# Patient Record
Sex: FEMALE | Race: WHITE | Employment: OTHER | URBAN - METROPOLITAN AREA
[De-identification: names, ages, dates, MRNs, and addresses within clinical notes are randomized per-mention and may not be internally consistent; named-entity substitution may affect disease eponyms.]

---

## 2021-12-03 RX ORDER — METFORMIN HYDROCHLORIDE 1000 MG/1
1000 TABLET ORAL DAILY
COMMUNITY

## 2021-12-03 RX ORDER — INSULIN GLARGINE AND LIXISENATIDE 100; 33 U/ML; UG/ML
INJECTION, SOLUTION SUBCUTANEOUS
COMMUNITY

## 2021-12-03 RX ORDER — ASPIRIN 81 MG/1
81 TABLET ORAL DAILY
COMMUNITY

## 2021-12-03 RX ORDER — CARVEDILOL 25 MG/1
25 TABLET ORAL DAILY
COMMUNITY

## 2021-12-03 NOTE — PERIOP NOTES
[facility]  Preoperative Instructions        Surgery Date 12/15/2021          Time of Arrival : to be called the day before surgery  Covid Testing 12/10/2021    1. On the day of your surgery, please report to the Surgical Services Registration Desk and sign in at your designated time. The Surgery Center is located to the right of the Emergency Room. 2. You must have someone with you to drive you home. You should not drive a car for 24 hours following surgery. Please make arrangements for a friend or family member to stay with you for the first 24 hours after your surgery. 3. Do not have anything to eat or drink (including water, gum, mints, coffee, juice) after midnight. ?This may not apply to medications prescribed by your physician. ?(Please note below the special instructions with medications to take the morning of your procedure.)    4. We recommend you do not drink any alcoholic beverages for 24 hours before and after your surgery. 5. Contact your surgeons office for instructions on the following medications: non-steroidal anti-inflammatory drugs (i.e. Advil, Aleve), vitamins, and supplements. (Some surgeons will want you to stop these medications prior to surgery and others may allow you to take them)  **If you are currently taking Plavix, Coumadin, Aspirin and/or other blood-thinning agents, contact your surgeon for instructions. ** Your surgeon will partner with the physician prescribing these medications to determine if it is safe to stop or if you need to continue taking. Please do not stop taking these medications without instructions from your surgeon    6. Wear comfortable clothes. Wear glasses instead of contacts. Do not bring any money or jewelry. Please bring picture ID, insurance card, and any prearranged co-payment or hospital payment. Do not wear make-up, particularly mascara the morning of your surgery.   Do not wear nail polish, particularly if you are having foot /hand surgery. Wear your hair loose or down, no ponytails, buns, anita pins or clips. All body piercings must be removed. Please shower with antibacterial soap for three consecutive days before and on the morning of surgery, but do not apply any lotions, powders or deodorants after the shower on the day of surgery. Please use a fresh towels after each shower. Please sleep in clean clothes and change bed linens the night before surgery. Please do not shave for 48 hours prior to surgery. Shaving of the face is acceptable. 7. You should understand that if you do not follow these instructions your surgery may be cancelled. If your physical condition changes (I.e. fever, cold or flu) please contact your surgeon as soon as possible. 8. It is important that you be on time. If a situation occurs where you may be late, please call [phone] (OR Holding Area). 9. If you have any questions and or problems, please call [phone] (Pre-admission Testing). 10. Your surgery time may be subject to change. You will receive a phone call the evening prior if your time changes. 11.  If having outpatient surgery, you must have someone to drive you here, stay with you during the duration of your stay, and to drive you home at time of discharge. Special Instructions:     TAKE ALL MEDICATIONS DAY OF SURGERY EXCEPT: metformin      I understand a pre-operative phone call will be made to verify my surgery time. In the event that I am not available, I give permission for a message to be left on my answering service and/or with another person?   Yes [phone]         ___________________      __________   _________    (Signature of Patient)             (Witness)                (Date and Time)

## 2021-12-03 NOTE — PERIOP NOTES
CBC and CMP 10/14/2021 rec from Dr. Stuart Hull, placed on patient's paper chart.  (Hgb 13.3, Plts 203, K 4.4, Na 136)

## 2021-12-10 ENCOUNTER — HOSPITAL ENCOUNTER (OUTPATIENT)
Dept: PREADMISSION TESTING | Age: 71
Discharge: HOME | End: 2021-12-10
Payer: MEDICARE

## 2021-12-10 PROCEDURE — U0005 INFEC AGEN DETEC AMPLI PROBE: HCPCS

## 2021-12-12 LAB
SARS-COV-2, XPLCVT: NOT DETECTED
SOURCE, COVRS: NORMAL

## 2021-12-15 ENCOUNTER — ANESTHESIA (OUTPATIENT)
Dept: SURGERY | Age: 71
End: 2021-12-15
Payer: MEDICARE

## 2021-12-15 ENCOUNTER — HOSPITAL ENCOUNTER (OUTPATIENT)
Age: 71
Setting detail: OUTPATIENT SURGERY
Discharge: HOME | End: 2021-12-15
Attending: SURGERY | Admitting: SURGERY
Payer: MEDICARE

## 2021-12-15 ENCOUNTER — ANESTHESIA EVENT (OUTPATIENT)
Dept: SURGERY | Age: 71
End: 2021-12-15
Payer: MEDICARE

## 2021-12-15 VITALS
OXYGEN SATURATION: 99 % | DIASTOLIC BLOOD PRESSURE: 62 MMHG | BODY MASS INDEX: 28.23 KG/M2 | RESPIRATION RATE: 18 BRPM | WEIGHT: 134.48 LBS | HEIGHT: 58 IN | HEART RATE: 72 BPM | TEMPERATURE: 97.3 F | SYSTOLIC BLOOD PRESSURE: 136 MMHG

## 2021-12-15 DIAGNOSIS — L76.82 PAIN AT SURGICAL INCISION: Primary | ICD-10-CM

## 2021-12-15 LAB
GLUCOSE BLD STRIP.AUTO-MCNC: 131 MG/DL (ref 65–117)
GLUCOSE BLD STRIP.AUTO-MCNC: 156 MG/DL (ref 65–117)
SERVICE CMNT-IMP: ABNORMAL
SERVICE CMNT-IMP: ABNORMAL

## 2021-12-15 PROCEDURE — 76210000020 HC REC RM PH II FIRST 0.5 HR: Performed by: SURGERY

## 2021-12-15 PROCEDURE — 82962 GLUCOSE BLOOD TEST: CPT

## 2021-12-15 PROCEDURE — 74011250636 HC RX REV CODE- 250/636: Performed by: STUDENT IN AN ORGANIZED HEALTH CARE EDUCATION/TRAINING PROGRAM

## 2021-12-15 PROCEDURE — 88305 TISSUE EXAM BY PATHOLOGIST: CPT

## 2021-12-15 PROCEDURE — 74011250636 HC RX REV CODE- 250/636: Performed by: NURSE ANESTHETIST, CERTIFIED REGISTERED

## 2021-12-15 PROCEDURE — 77030031139 HC SUT VCRL2 J&J -A: Performed by: SURGERY

## 2021-12-15 PROCEDURE — 74011000250 HC RX REV CODE- 250: Performed by: NURSE ANESTHETIST, CERTIFIED REGISTERED

## 2021-12-15 PROCEDURE — 88311 DECALCIFY TISSUE: CPT

## 2021-12-15 PROCEDURE — 74011250637 HC RX REV CODE- 250/637: Performed by: SURGERY

## 2021-12-15 PROCEDURE — 2709999900 HC NON-CHARGEABLE SUPPLY: Performed by: SURGERY

## 2021-12-15 PROCEDURE — 77030002916 HC SUT ETHLN J&J -A: Performed by: SURGERY

## 2021-12-15 PROCEDURE — 76060000032 HC ANESTHESIA 0.5 TO 1 HR: Performed by: SURGERY

## 2021-12-15 PROCEDURE — 76210000016 HC OR PH I REC 1 TO 1.5 HR: Performed by: SURGERY

## 2021-12-15 PROCEDURE — 76010000138 HC OR TIME 0.5 TO 1 HR: Performed by: SURGERY

## 2021-12-15 RX ORDER — LIDOCAINE HYDROCHLORIDE 10 MG/ML
0.1 INJECTION, SOLUTION EPIDURAL; INFILTRATION; INTRACAUDAL; PERINEURAL AS NEEDED
Status: DISCONTINUED | OUTPATIENT
Start: 2021-12-15 | End: 2021-12-15 | Stop reason: HOSPADM

## 2021-12-15 RX ORDER — OXYCODONE HYDROCHLORIDE 5 MG/1
5 TABLET ORAL AS NEEDED
Status: CANCELLED | OUTPATIENT
Start: 2021-12-15

## 2021-12-15 RX ORDER — SODIUM CHLORIDE, SODIUM LACTATE, POTASSIUM CHLORIDE, CALCIUM CHLORIDE 600; 310; 30; 20 MG/100ML; MG/100ML; MG/100ML; MG/100ML
INJECTION, SOLUTION INTRAVENOUS
Status: DISCONTINUED | OUTPATIENT
Start: 2021-12-15 | End: 2021-12-15 | Stop reason: HOSPADM

## 2021-12-15 RX ORDER — LIDOCAINE HYDROCHLORIDE 20 MG/ML
INJECTION, SOLUTION EPIDURAL; INFILTRATION; INTRACAUDAL; PERINEURAL AS NEEDED
Status: DISCONTINUED | OUTPATIENT
Start: 2021-12-15 | End: 2021-12-15 | Stop reason: HOSPADM

## 2021-12-15 RX ORDER — SODIUM CHLORIDE 0.9 % (FLUSH) 0.9 %
5-40 SYRINGE (ML) INJECTION EVERY 8 HOURS
Status: DISCONTINUED | OUTPATIENT
Start: 2021-12-15 | End: 2021-12-15 | Stop reason: HOSPADM

## 2021-12-15 RX ORDER — ROPIVACAINE HYDROCHLORIDE 5 MG/ML
INJECTION, SOLUTION EPIDURAL; INFILTRATION; PERINEURAL
Status: COMPLETED | OUTPATIENT
Start: 2021-12-15 | End: 2021-12-15

## 2021-12-15 RX ORDER — SODIUM CHLORIDE, SODIUM LACTATE, POTASSIUM CHLORIDE, CALCIUM CHLORIDE 600; 310; 30; 20 MG/100ML; MG/100ML; MG/100ML; MG/100ML
25 INJECTION, SOLUTION INTRAVENOUS CONTINUOUS
Status: DISCONTINUED | OUTPATIENT
Start: 2021-12-15 | End: 2021-12-15 | Stop reason: HOSPADM

## 2021-12-15 RX ORDER — CEFAZOLIN SODIUM 1 G/3ML
2 INJECTION, POWDER, FOR SOLUTION INTRAMUSCULAR; INTRAVENOUS ONCE
Status: DISCONTINUED | OUTPATIENT
Start: 2021-12-15 | End: 2021-12-15

## 2021-12-15 RX ORDER — TRAMADOL HYDROCHLORIDE 50 MG/1
50 TABLET ORAL
Qty: 12 TABLET | Refills: 0 | Status: SHIPPED | OUTPATIENT
Start: 2021-12-15 | End: 2021-12-18

## 2021-12-15 RX ORDER — FENTANYL CITRATE 50 UG/ML
25 INJECTION, SOLUTION INTRAMUSCULAR; INTRAVENOUS
Status: CANCELLED | OUTPATIENT
Start: 2021-12-15

## 2021-12-15 RX ORDER — CEFAZOLIN SODIUM/WATER 2 G/20 ML
SYRINGE (ML) INTRAVENOUS AS NEEDED
Status: DISCONTINUED | OUTPATIENT
Start: 2021-12-15 | End: 2021-12-15 | Stop reason: HOSPADM

## 2021-12-15 RX ORDER — ONDANSETRON 2 MG/ML
4 INJECTION INTRAMUSCULAR; INTRAVENOUS AS NEEDED
Status: CANCELLED | OUTPATIENT
Start: 2021-12-15

## 2021-12-15 RX ORDER — ONDANSETRON 2 MG/ML
INJECTION INTRAMUSCULAR; INTRAVENOUS AS NEEDED
Status: DISCONTINUED | OUTPATIENT
Start: 2021-12-15 | End: 2021-12-15 | Stop reason: HOSPADM

## 2021-12-15 RX ORDER — PROPOFOL 10 MG/ML
INJECTION, EMULSION INTRAVENOUS
Status: DISCONTINUED | OUTPATIENT
Start: 2021-12-15 | End: 2021-12-15 | Stop reason: HOSPADM

## 2021-12-15 RX ORDER — MIDAZOLAM HYDROCHLORIDE 1 MG/ML
INJECTION, SOLUTION INTRAMUSCULAR; INTRAVENOUS
Status: COMPLETED | OUTPATIENT
Start: 2021-12-15 | End: 2021-12-15

## 2021-12-15 RX ADMIN — CEFAZOLIN 2 G: 1 INJECTION, POWDER, FOR SOLUTION INTRAVENOUS at 10:56

## 2021-12-15 RX ADMIN — PROPOFOL 50 MCG/KG/MIN: 10 INJECTION, EMULSION INTRAVENOUS at 10:56

## 2021-12-15 RX ADMIN — MIDAZOLAM HYDROCHLORIDE 2 MG: 1 INJECTION, SOLUTION INTRAMUSCULAR; INTRAVENOUS at 10:29

## 2021-12-15 RX ADMIN — Medication 3 AMPULE: at 09:49

## 2021-12-15 RX ADMIN — ONDANSETRON HYDROCHLORIDE 4 MG: 2 INJECTION, SOLUTION INTRAMUSCULAR; INTRAVENOUS at 11:27

## 2021-12-15 RX ADMIN — ROPIVACAINE HYDROCHLORIDE 30 ML: 5 INJECTION, SOLUTION EPIDURAL; INFILTRATION; PERINEURAL at 10:29

## 2021-12-15 RX ADMIN — SODIUM CHLORIDE, POTASSIUM CHLORIDE, SODIUM LACTATE AND CALCIUM CHLORIDE: 600; 310; 30; 20 INJECTION, SOLUTION INTRAVENOUS at 10:52

## 2021-12-15 RX ADMIN — LIDOCAINE HYDROCHLORIDE 20 MG: 20 INJECTION, SOLUTION EPIDURAL; INFILTRATION; INTRACAUDAL; PERINEURAL at 10:59

## 2021-12-15 RX ADMIN — SODIUM CHLORIDE, POTASSIUM CHLORIDE, SODIUM LACTATE AND CALCIUM CHLORIDE 25 ML/HR: 600; 310; 30; 20 INJECTION, SOLUTION INTRAVENOUS at 10:08

## 2021-12-15 NOTE — OP NOTES
[address]  OPERATIVE REPORT    Name:  Bolivar Sapp  MR#:  [id number]  :  1950  ACCOUNT #:  [de-identified]  DATE OF SERVICE:  12/15/2021      PREOPERATIVE DIAGNOSIS:  Chronic left third toe wound. POSTOPERATIVE DIAGNOSIS:  Chronic left third toe wound. PROCEDURE PERFORMED:  Left third ray amputation. SURGEON:  Rey James MD    ANESTHESIA:  Regional block. SPECIMENS REMOVED:  As above    INDICATIONS:  The patient is a 40-year-old with a longstanding nonhealing left third toe wound which extends into the joint space. She has a palpable dorsal pedal pulse on the foot and she was advised ray amputation which she has accepted. PROCEDURE:  After informed consent, the patient supine on the operating table, after adequate induction of regional block anesthesia, site, and patient confirmation, and administration of prophylactic antibiotics, the left leg was prepped and draped in sterile fashion. A fishmouth incision was made at the base of the toe with the posterior plantar flap created longer than the dorsal flap. The ray was amputated and removed in its entirety and sent for gross pathologic specimen. The wound was irrigated and deemed hemostatic. There was excellent bleeding to cut tissue edges. Subcutaneous and soft tissue was closed with interrupted buried Vicryl sutures. The skin was closed with 5 interrupted nylon sutures. The patient tolerated the procedure well. No complications.       Yakelin Jimenez MD      [id number]  D:  12/15/2021 13:43  T:  12/15/2021 18:49  JOB #:  [id number]  CC:  Rey James MD

## 2021-12-15 NOTE — ANESTHESIA PROCEDURE NOTES
Peripheral Block    Start time: 12/15/2021 10:18 AM  End time: 12/15/2021 10:25 AM  Performed by: Magy Kaminski MD  Authorized by: Magy Kaminski MD       Pre-procedure: Indications: at surgeon's request, post-op pain management, procedure for pain and primary anesthetic    Preanesthetic Checklist: patient identified, risks and benefits discussed, site marked, timeout performed, anesthesia consent given and patient being monitored    Timeout Time: 10:16 EST          Block Type:   Block Type:  Saphenous and popliteal  Laterality:  Left  Monitoring:  Standard ASA monitoring, continuous pulse ox, frequent vital sign checks, heart rate, responsive to questions and oxygen  Injection Technique:  Single shot  Procedures: ultrasound guided    Patient Position: supine  Prep: chlorhexidine    : 10 cm above the popliteal fossa & tibial tuberosity.   Needle Type:  Stimuplex  Needle Gauge:  22 G  Needle Localization:  Ultrasound guidance  Medication Injected:  Midazolam (VERSED) injection, 2 mg  ropivacaine (PF) (NAROPIN)(0.5%) 5 mg/mL injection, 30 mL    Assessment:  Number of attempts:  1  Injection Assessment:  Incremental injection every 5 mL, local visualized surrounding nerve on ultrasound, negative aspiration for blood, no intravascular symptoms, no paresthesia and ultrasound image on chart  Patient tolerance:  Patient tolerated the procedure well with no immediate complications

## 2021-12-15 NOTE — PROGRESS NOTES
Patient discharged to home. Iv removed. Discharge instructions, scripts, and medication education done with patient and son.

## 2021-12-15 NOTE — BRIEF OP NOTE
Brief Postoperative Note    Patient: Timothy Nova  YOB: 1950  MRN: [id number]    Date of Procedure: 12/15/2021     Pre-Op Diagnosis: Chronic left 3rd toe wound    Post-Op Diagnosis: same      Procedure(s): LEFT THIRD TOE RAY AMPUTATION    Surgeon(s):  Ronnie Jon MD    Anesthesia: MAC     Estimated Blood Loss (mL): none    Complications: none    Specimens:   ID Type Source Tests Collected by Time Destination   1 : Left third toe amputation Preservative Toe  Ronnie Jon MD 12/15/2021 1113 Pathology        Findings: good vascularity    Electronically Signed by Arabella De Guzman MD on 12/15/2021 at 11:46 AM

## 2021-12-15 NOTE — PERIOP NOTES
1133 - PT ARRIVED INTO PACU 3 - SNORING. RESP EVEN AND UNLABORED. POXON 2LNC > 96%. BSB AND COARSE.  L FOOT KERLIX CD&I RONN PD PULSES 1+ PALP. 1215 - PT A&OX4. FLORES SPON AND TO COMMAND. LLE WEAKLY SECONDARY TO BLOCK. PT JALEEL SIPS OF G'JEMAL. 1230 - PT IN PHASE 2.  RESTING QUIETLY OFFER 0 COMPLAINTS/.  IV DC'C SECONDARY TO SITE INFILTRATED. WARM COMPRESS APPLIED.    1300 - REPORT GIVEN TO FRANKY RN - WAITING DISCHARGE INSTRUCTIONS - PT DRESSED AND IN WHEELCHAIR.

## 2021-12-15 NOTE — DISCHARGE INSTRUCTIONS
Patient Discharge Instructions    Cordelia Alba / [id number] : 1950    Admitted 12/15/2021 Discharged: 12/15/2021     What to do at Home  Recommended activity: Elevate leg for 24 hrs  Leave dressing for 72-96 hours. Keep dry  Surgical (nola) shoe for discharge  May weight bear as tolerated, but try to limit excess walking       Information obtained by :  I understand that if any problems occur once I am at home I am to contact my physician. I understand and acknowledge receipt of the instructions indicated above. R.N.'s Signature                                                                  Date/Time                                                                                                                                              Patient or Representative Signature                                                          Date/Time      Duncan Rabago MD    DISCHARGE SUMMARY from Nurse    The following personal items are in your possession at time of discharge:    Dental Appliances: None  Visual Aid: None  Home Medications: None  Jewelry: None  Clothing: None (left in in pt. room)  Other Valuables: None             PATIENT INSTRUCTIONS:    After general anesthesia or intravenous sedation, for 24 hours or while taking prescription Narcotics:  · Limit your activities  · Do not drive and operate hazardous machinery  · Do not make important personal or business decisions  · Do  not drink alcoholic beverages  · If you have not urinated within 8 hours after discharge, please contact your surgeon on call.     Report the following to your surgeon:  · Excessive pain, swelling, redness or odor of or around the surgical area  · Temperature over 100.5  · Nausea and vomiting lasting longer than 4 hours or if unable to take medications  · Any signs of decreased circulation or nerve impairment to extremity: change in color, persistent  numbness, tingling, coldness or increase pain  · Any questions    To prevent infection remember to refer to your handout on handwashing given to you by your nurse. *  Please give a list of your current medications to your Primary Care Provider. *  Please update this list whenever your medications are discontinued, doses are      changed, or new medications (including over-the-counter products) are added. *  Please carry medication information at all times in case of emergency situations. These are general instructions for a healthy lifestyle:    No smoking/ No tobacco products/ Avoid exposure to second hand smoke    Surgeon General's Warning:  Quitting smoking now greatly reduces serious risk to your health. Obesity, smoking, and sedentary lifestyle greatly increases your risk for illness    A healthy diet, regular physical exercise & weight monitoring are important for maintaining a healthy lifestyle    You may be retaining fluid if you have a history of heart failure or if you experience any of the following symptoms:  Weight gain of 3 pounds or more overnight or 5 pounds in a week, increased swelling in our hands or feet or shortness of breath while lying flat in bed. Please call your doctor as soon as you notice any of these symptoms; do not wait until your next office visit. Recognize signs and symptoms of STROKE:    F-face looks uneven    A-arms unable to move or move unevenly    S-speech slurred or non-existent    T-time-call 911 as soon as signs and symptoms begin-DO NOT go       Back to bed or wait to see if you get better-TIME IS BRAIN. The discharge information has been reviewed with the patient. The patient verbalized understanding. TO PREVENT AN INFECTION      1. [address] YOUR HANDS     To prevent infection, good handwashing is the most important thing you or your caregiver can do.       [facility] your hands with soap and water or use the hand  we gave you before you touch any wounds. 2. SHOWER     Use the antibacterial soap we gave you when you take a shower.  Shower with this soap until your wounds are healed.  To reach all areas of your body, you may need someone to help you.  Dont forget to clean your belly button with every shower. 3.  USE CLEAN SHEETS     Use freshly cleaned sheets on your bed after surgery.  To keep the surgery site clean, do not allow pets to sleep with you while your wound is still healing. 4. STOP SMOKING     Stop smoking, or at least cut back on smoking     Smoking slows your healing. 5.  CONTROL YOUR BLOOD SUGAR     High blood sugars slow wound healing. If you are diabetic, control your blood sugar levels before and after your surgery. Patient Education      Tramadol (Ultram, Ultram ER, Ryzolt, Theratramadol-60) - (By mouth)   Why this medicine is used:   Treats moderate to severe pain. This medicine is a narcotic pain reliever. Contact a nurse or doctor right away if you have:  · Extreme weakness, shallow breathing  · Seizures  · Seeing or hearing things that are not there  · Anxiety, restlessness, muscle spasms, fever, sweating, diarrhea  · Slow or fast heartbeat     Common side effects:  · Nausea, vomiting, constipation  · Headache, drowsiness  · Itching, feeling of warmth, redness of the face, neck, arms, and upper chest  © 2017 [facility] Information is for End User's use only and may not be sold, redistributed or otherwise used for commercial purposes.

## 2021-12-15 NOTE — ANESTHESIA PREPROCEDURE EVALUATION
Relevant Problems   No relevant active problems       Anesthetic History               Review of Systems / Medical History  Patient summary reviewed and pertinent labs reviewed    Pulmonary    COPD          Pertinent negatives: No asthma and smoker  Comments: Former smoker   Neuro/Psych       CVA: no residual symptoms    Pertinent negatives: No seizures   Cardiovascular    Hypertension          Hyperlipidemia  Pertinent negatives: No past MI and CHF       GI/Hepatic/Renal             Pertinent negatives: No renal disease   Endo/Other    Diabetes: type 2, using insulin      Pertinent negatives: No obesity   Other Findings            Physical Exam    Airway  Mallampati: II  TM Distance: > 6 cm  Neck ROM: normal range of motion   Mouth opening: Normal     Cardiovascular  Regular rate and rhythm,  S1 and S2 normal,  no murmur, click, rub, or gallop  Rhythm: regular  Rate: normal         Dental  No notable dental hx       Pulmonary  Breath sounds clear to auscultation               Abdominal  GI exam deferred       Other Findings            Anesthetic Plan    ASA: 3  Anesthesia type: MAC and regional - popliteal fossa block and saphenous block      Post-op pain plan if not by surgeon: peripheral nerve block single    Induction: Intravenous  Anesthetic plan and risks discussed with: Patient

## 2021-12-15 NOTE — ANESTHESIA POSTPROCEDURE EVALUATION
Procedure(s):  LEFT THIRD TOE AMPUTATION. MAC, regional    Anesthesia Post Evaluation      Multimodal analgesia: multimodal analgesia used between 6 hours prior to anesthesia start to PACU discharge  Patient location during evaluation: PACU  Patient participation: complete - patient participated  Level of consciousness: awake and alert  Pain score: 0  Pain management: adequate  Airway patency: patent  Anesthetic complications: no  Cardiovascular status: acceptable, hemodynamically stable and blood pressure returned to baseline  Respiratory status: acceptable and room air  Hydration status: euvolemic  Comments: Ambulatory precautions discussed with patient and communicated to family via RN. Post anesthesia nausea and vomiting:  none  Final Post Anesthesia Temperature Assessment:  Normothermia (36.0-37.5 degrees C)      INITIAL Post-op Vital signs:   Vitals Value Taken Time   /57 12/15/21 1225   Temp 36.3 °C (97.3 °F) 12/15/21 1134   Pulse 80 12/15/21 1229   Resp 18 12/15/21 1229   SpO2 98 % 12/15/21 1229   Vitals shown include unvalidated device data.

## 2023-05-21 RX ORDER — ASPIRIN 81 MG/1
81 TABLET ORAL DAILY
COMMUNITY

## 2023-05-21 RX ORDER — ATORVASTATIN CALCIUM 10 MG/1
10 TABLET, FILM COATED ORAL DAILY
COMMUNITY

## 2023-05-21 RX ORDER — CARVEDILOL 25 MG/1
25 TABLET ORAL DAILY
COMMUNITY

## 2023-05-21 RX ORDER — INSULIN GLARGINE AND LIXISENATIDE 100; 33 U/ML; UG/ML
INJECTION, SOLUTION SUBCUTANEOUS
COMMUNITY

## 2024-07-01 ENCOUNTER — APPOINTMENT (OUTPATIENT)
Facility: HOSPITAL | Age: 74
DRG: 092 | End: 2024-07-01
Payer: MEDICARE

## 2024-07-01 ENCOUNTER — HOSPITAL ENCOUNTER (INPATIENT)
Facility: HOSPITAL | Age: 74
LOS: 7 days | Discharge: NURSING, REHAB OR LONG-TERM CARE | DRG: 092 | End: 2024-07-09
Attending: EMERGENCY MEDICINE | Admitting: STUDENT IN AN ORGANIZED HEALTH CARE EDUCATION/TRAINING PROGRAM
Payer: MEDICARE

## 2024-07-01 DIAGNOSIS — N30.00 ACUTE CYSTITIS WITHOUT HEMATURIA: Primary | ICD-10-CM

## 2024-07-01 DIAGNOSIS — R26.81 GAIT INSTABILITY: ICD-10-CM

## 2024-07-01 DIAGNOSIS — R29.898 BILATERAL LEG WEAKNESS: ICD-10-CM

## 2024-07-01 DIAGNOSIS — R29.6 RECURRENT FALLS: ICD-10-CM

## 2024-07-01 LAB
ALBUMIN SERPL-MCNC: 3.4 G/DL (ref 3.5–5)
ALBUMIN/GLOB SERPL: 0.9 (ref 1.1–2.2)
ALP SERPL-CCNC: 81 U/L (ref 45–117)
ALT SERPL-CCNC: 61 U/L (ref 12–78)
ANION GAP SERPL CALC-SCNC: 6 MMOL/L (ref 5–15)
APPEARANCE UR: CLEAR
AST SERPL-CCNC: 19 U/L (ref 15–37)
BACTERIA URNS QL MICRO: ABNORMAL /HPF
BASOPHILS # BLD: 0.1 K/UL (ref 0–0.1)
BASOPHILS NFR BLD: 1 % (ref 0–1)
BILIRUB SERPL-MCNC: 0.4 MG/DL (ref 0.2–1)
BILIRUB UR QL: NEGATIVE
BUN SERPL-MCNC: 35 MG/DL (ref 6–20)
BUN/CREAT SERPL: 33 (ref 12–20)
CALCIUM SERPL-MCNC: 9.7 MG/DL (ref 8.5–10.1)
CHLORIDE SERPL-SCNC: 103 MMOL/L (ref 97–108)
CO2 SERPL-SCNC: 28 MMOL/L (ref 21–32)
COLOR UR: ABNORMAL
CREAT SERPL-MCNC: 1.06 MG/DL (ref 0.55–1.02)
DIFFERENTIAL METHOD BLD: NORMAL
EOSINOPHIL # BLD: 0.3 K/UL (ref 0–0.4)
EOSINOPHIL NFR BLD: 3 % (ref 0–7)
EPITH CASTS URNS QL MICRO: ABNORMAL /LPF
ERYTHROCYTE [DISTWIDTH] IN BLOOD BY AUTOMATED COUNT: 13.2 % (ref 11.5–14.5)
GLOBULIN SER CALC-MCNC: 3.6 G/DL (ref 2–4)
GLUCOSE SERPL-MCNC: 122 MG/DL (ref 65–100)
GLUCOSE UR STRIP.AUTO-MCNC: NEGATIVE MG/DL
HCT VFR BLD AUTO: 39.1 % (ref 35–47)
HGB BLD-MCNC: 12.5 G/DL (ref 11.5–16)
HGB UR QL STRIP: NEGATIVE
HYALINE CASTS URNS QL MICRO: ABNORMAL /LPF (ref 0–2)
IMM GRANULOCYTES # BLD AUTO: 0 K/UL (ref 0–0.04)
IMM GRANULOCYTES NFR BLD AUTO: 0 % (ref 0–0.5)
KETONES UR QL STRIP.AUTO: ABNORMAL MG/DL
LEUKOCYTE ESTERASE UR QL STRIP.AUTO: ABNORMAL
LYMPHOCYTES # BLD: 2.2 K/UL (ref 0.8–3.5)
LYMPHOCYTES NFR BLD: 26 % (ref 12–49)
MCH RBC QN AUTO: 27.5 PG (ref 26–34)
MCHC RBC AUTO-ENTMCNC: 32 G/DL (ref 30–36.5)
MCV RBC AUTO: 86.1 FL (ref 80–99)
MONOCYTES # BLD: 0.7 K/UL (ref 0–1)
MONOCYTES NFR BLD: 8 % (ref 5–13)
NEUTS SEG # BLD: 5.2 K/UL (ref 1.8–8)
NEUTS SEG NFR BLD: 62 % (ref 32–75)
NITRITE UR QL STRIP.AUTO: POSITIVE
NRBC # BLD: 0 K/UL (ref 0–0.01)
NRBC BLD-RTO: 0 PER 100 WBC
PH UR STRIP: 5 (ref 5–8)
PLATELET # BLD AUTO: 202 K/UL (ref 150–400)
PMV BLD AUTO: 10.8 FL (ref 8.9–12.9)
POTASSIUM SERPL-SCNC: 3.9 MMOL/L (ref 3.5–5.1)
PROT SERPL-MCNC: 7 G/DL (ref 6.4–8.2)
PROT UR STRIP-MCNC: NEGATIVE MG/DL
RBC # BLD AUTO: 4.54 M/UL (ref 3.8–5.2)
RBC #/AREA URNS HPF: ABNORMAL /HPF (ref 0–5)
SODIUM SERPL-SCNC: 137 MMOL/L (ref 136–145)
SP GR UR REFRACTOMETRY: 1.02
TROPONIN I SERPL HS-MCNC: 9 NG/L (ref 0–51)
URINE CULTURE IF INDICATED: ABNORMAL
UROBILINOGEN UR QL STRIP.AUTO: 0.2 EU/DL (ref 0.2–1)
WBC # BLD AUTO: 8.4 K/UL (ref 3.6–11)
WBC URNS QL MICRO: ABNORMAL /HPF (ref 0–4)

## 2024-07-01 PROCEDURE — 87086 URINE CULTURE/COLONY COUNT: CPT

## 2024-07-01 PROCEDURE — 87186 SC STD MICRODIL/AGAR DIL: CPT

## 2024-07-01 PROCEDURE — 36415 COLL VENOUS BLD VENIPUNCTURE: CPT

## 2024-07-01 PROCEDURE — 80053 COMPREHEN METABOLIC PANEL: CPT

## 2024-07-01 PROCEDURE — 2580000003 HC RX 258: Performed by: EMERGENCY MEDICINE

## 2024-07-01 PROCEDURE — 81001 URINALYSIS AUTO W/SCOPE: CPT

## 2024-07-01 PROCEDURE — 96366 THER/PROPH/DIAG IV INF ADDON: CPT

## 2024-07-01 PROCEDURE — 96365 THER/PROPH/DIAG IV INF INIT: CPT

## 2024-07-01 PROCEDURE — 84484 ASSAY OF TROPONIN QUANT: CPT

## 2024-07-01 PROCEDURE — 71045 X-RAY EXAM CHEST 1 VIEW: CPT

## 2024-07-01 PROCEDURE — 93005 ELECTROCARDIOGRAM TRACING: CPT

## 2024-07-01 PROCEDURE — 99285 EMERGENCY DEPT VISIT HI MDM: CPT

## 2024-07-01 PROCEDURE — 6360000002 HC RX W HCPCS: Performed by: EMERGENCY MEDICINE

## 2024-07-01 PROCEDURE — 85025 COMPLETE CBC W/AUTO DIFF WBC: CPT

## 2024-07-01 PROCEDURE — 70450 CT HEAD/BRAIN W/O DYE: CPT

## 2024-07-01 PROCEDURE — 87088 URINE BACTERIA CULTURE: CPT

## 2024-07-01 RX ORDER — INSULIN GLARGINE 100 [IU]/ML
60 INJECTION, SOLUTION SUBCUTANEOUS 2 TIMES DAILY
COMMUNITY

## 2024-07-01 RX ORDER — LOSARTAN POTASSIUM AND HYDROCHLOROTHIAZIDE 25; 100 MG/1; MG/1
1 TABLET ORAL DAILY
COMMUNITY

## 2024-07-01 RX ORDER — ONDANSETRON 2 MG/ML
4 INJECTION INTRAMUSCULAR; INTRAVENOUS EVERY 4 HOURS PRN
Status: DISCONTINUED | OUTPATIENT
Start: 2024-07-01 | End: 2024-07-02

## 2024-07-01 RX ADMIN — SODIUM CHLORIDE 1000 MG: 900 INJECTION INTRAVENOUS at 20:54

## 2024-07-01 ASSESSMENT — LIFESTYLE VARIABLES
HOW OFTEN DO YOU HAVE A DRINK CONTAINING ALCOHOL: NEVER
HOW MANY STANDARD DRINKS CONTAINING ALCOHOL DO YOU HAVE ON A TYPICAL DAY: PATIENT DOES NOT DRINK

## 2024-07-01 ASSESSMENT — PAIN SCALES - GENERAL: PAINLEVEL_OUTOF10: 5

## 2024-07-01 NOTE — ED NOTES
Rec'd report from Rachael. Checked in with patient and informed them of next steps and that we need a urine sample in order to complete testing. We discussed the use of a purewick and applied that with continuous mid suction to wall. They had no questions/needs at this time.

## 2024-07-01 NOTE — ED PROVIDER NOTES
[facility] 3 MED TELE  EMERGENCY DEPARTMENT ENCOUNTER       Pt Name: Ann Turner  MRN: [id number]  Birthdate 1950  Date of evaluation: 7/1/2024  Provider: Troy Bennett MD   PCP: Jethro Amos MD  Note Started: 11:13 AM 7/1/24     CHIEF COMPLAINT       Chief Complaint   Patient presents with    Extremity Weakness     Arrives to triage in wheelchair c/o BLE weakness since Friday, L worse than R. Pt states Friday morning her LLE gave out from under her and caused her to fall and land on her tailbone, which is now painful. Since fall pt has generally felt weak and dizzy.        HISTORY OF PRESENT ILLNESS: 1 or more elements      History From: Patient, son, History limited by: No limitations     Ann Turner is a 73 y.o. female with prior history of CVA, COPD without baseline hypertension, diabetes who presents with chief complaint of bilateral lower extremity weakness (right.  Symptoms been present over the past week but significantly worsened 3 days ago.  Prior to 3 days ago she was able to ambulate with use of rollator and independently.  Over the past 3 days she has not been able to ambulate by herself and requires constant assistance.  She has had multiple falls and states that her left leg is much weaker than her right but both legs are weak.  Denies any other weakness or numbness, fevers, chills, confusion, chest pain, shortness of breath, abdominal pain, nausea, vomiting, diarrhea, or urinary symptoms.  She is worried that she is having another stroke     Nursing Notes were all reviewed and agreed with or any disagreements were addressed in the HPI.     REVIEW OF SYSTEMS        Positives and Pertinent negatives as per HPI.    PAST HISTORY     Past Medical History:  Past Medical History:   Diagnosis Date    Chronic obstructive pulmonary disease (HCC)     CVA (cerebral infarction) 7/15/2014    HTN (hypertension)     Hyperlipidemia 7/15/2014    Nausea & vomiting     Type 2 diabetes mellitus (HCC)

## 2024-07-02 ENCOUNTER — APPOINTMENT (OUTPATIENT)
Facility: HOSPITAL | Age: 74
DRG: 092 | End: 2024-07-02
Payer: MEDICARE

## 2024-07-02 ENCOUNTER — HOSPITAL ENCOUNTER (INPATIENT)
Facility: HOSPITAL | Age: 74
Discharge: HOME | DRG: 092 | End: 2024-07-05
Payer: MEDICARE

## 2024-07-02 PROBLEM — R26.81 UNSTEADY GAIT: Status: ACTIVE | Noted: 2024-07-02

## 2024-07-02 LAB
GLUCOSE BLD STRIP.AUTO-MCNC: 112 MG/DL (ref 65–117)
GLUCOSE BLD STRIP.AUTO-MCNC: 136 MG/DL (ref 65–117)
GLUCOSE BLD STRIP.AUTO-MCNC: 141 MG/DL (ref 65–117)
GLUCOSE BLD STRIP.AUTO-MCNC: 202 MG/DL (ref 65–117)
GLUCOSE BLD STRIP.AUTO-MCNC: 291 MG/DL (ref 65–117)
SERVICE CMNT-IMP: ABNORMAL
SERVICE CMNT-IMP: NORMAL

## 2024-07-02 PROCEDURE — 97161 PT EVAL LOW COMPLEX 20 MIN: CPT | Performed by: PHYSICAL THERAPIST

## 2024-07-02 PROCEDURE — 2580000003 HC RX 258: Performed by: INTERNAL MEDICINE

## 2024-07-02 PROCEDURE — 1100000003 HC PRIVATE W/ TELEMETRY

## 2024-07-02 PROCEDURE — 70551 MRI BRAIN STEM W/O DYE: CPT

## 2024-07-02 PROCEDURE — 97530 THERAPEUTIC ACTIVITIES: CPT | Performed by: PHYSICAL THERAPIST

## 2024-07-02 PROCEDURE — 6370000000 HC RX 637 (ALT 250 FOR IP): Performed by: NURSE PRACTITIONER

## 2024-07-02 PROCEDURE — 6360000002 HC RX W HCPCS: Performed by: STUDENT IN AN ORGANIZED HEALTH CARE EDUCATION/TRAINING PROGRAM

## 2024-07-02 PROCEDURE — 72141 MRI NECK SPINE W/O DYE: CPT

## 2024-07-02 PROCEDURE — 72220 X-RAY EXAM SACRUM TAILBONE: CPT

## 2024-07-02 PROCEDURE — 99221 1ST HOSP IP/OBS SF/LOW 40: CPT | Performed by: PSYCHIATRY & NEUROLOGY

## 2024-07-02 PROCEDURE — 6370000000 HC RX 637 (ALT 250 FOR IP): Performed by: STUDENT IN AN ORGANIZED HEALTH CARE EDUCATION/TRAINING PROGRAM

## 2024-07-02 PROCEDURE — 97165 OT EVAL LOW COMPLEX 30 MIN: CPT

## 2024-07-02 PROCEDURE — 6360000002 HC RX W HCPCS: Performed by: INTERNAL MEDICINE

## 2024-07-02 PROCEDURE — 97530 THERAPEUTIC ACTIVITIES: CPT

## 2024-07-02 PROCEDURE — 82962 GLUCOSE BLOOD TEST: CPT

## 2024-07-02 PROCEDURE — 2580000003 HC RX 258: Performed by: STUDENT IN AN ORGANIZED HEALTH CARE EDUCATION/TRAINING PROGRAM

## 2024-07-02 PROCEDURE — 73521 X-RAY EXAM HIPS BI 2 VIEWS: CPT

## 2024-07-02 PROCEDURE — 72146 MRI CHEST SPINE W/O DYE: CPT

## 2024-07-02 RX ORDER — ENOXAPARIN SODIUM 100 MG/ML
40 INJECTION SUBCUTANEOUS DAILY
Status: DISCONTINUED | OUTPATIENT
Start: 2024-07-02 | End: 2024-07-09 | Stop reason: HOSPADM

## 2024-07-02 RX ORDER — LOSARTAN POTASSIUM 50 MG/1
100 TABLET ORAL DAILY
Status: DISCONTINUED | OUTPATIENT
Start: 2024-07-02 | End: 2024-07-02

## 2024-07-02 RX ORDER — SODIUM CHLORIDE 9 MG/ML
INJECTION, SOLUTION INTRAVENOUS PRN
Status: DISCONTINUED | OUTPATIENT
Start: 2024-07-02 | End: 2024-07-09 | Stop reason: HOSPADM

## 2024-07-02 RX ORDER — SODIUM CHLORIDE 0.9 % (FLUSH) 0.9 %
5-40 SYRINGE (ML) INJECTION EVERY 12 HOURS SCHEDULED
Status: DISCONTINUED | OUTPATIENT
Start: 2024-07-02 | End: 2024-07-09 | Stop reason: HOSPADM

## 2024-07-02 RX ORDER — ONDANSETRON 4 MG/1
4 TABLET, ORALLY DISINTEGRATING ORAL EVERY 8 HOURS PRN
Status: DISCONTINUED | OUTPATIENT
Start: 2024-07-02 | End: 2024-07-09 | Stop reason: HOSPADM

## 2024-07-02 RX ORDER — LORAZEPAM 2 MG/ML
1 INJECTION INTRAMUSCULAR
Status: COMPLETED | OUTPATIENT
Start: 2024-07-02 | End: 2024-07-02

## 2024-07-02 RX ORDER — DEXTROSE MONOHYDRATE 100 MG/ML
INJECTION, SOLUTION INTRAVENOUS CONTINUOUS PRN
Status: DISCONTINUED | OUTPATIENT
Start: 2024-07-02 | End: 2024-07-09 | Stop reason: HOSPADM

## 2024-07-02 RX ORDER — HYDROCHLOROTHIAZIDE 25 MG/1
25 TABLET ORAL DAILY
Status: DISCONTINUED | OUTPATIENT
Start: 2024-07-02 | End: 2024-07-02

## 2024-07-02 RX ORDER — HYDROCHLOROTHIAZIDE 25 MG/1
25 TABLET ORAL DAILY
Status: DISCONTINUED | OUTPATIENT
Start: 2024-07-02 | End: 2024-07-09 | Stop reason: HOSPADM

## 2024-07-02 RX ORDER — LOSARTAN POTASSIUM 50 MG/1
100 TABLET ORAL DAILY
Status: DISCONTINUED | OUTPATIENT
Start: 2024-07-02 | End: 2024-07-09 | Stop reason: HOSPADM

## 2024-07-02 RX ORDER — ASPIRIN 81 MG/1
81 TABLET ORAL DAILY
Status: DISCONTINUED | OUTPATIENT
Start: 2024-07-02 | End: 2024-07-09 | Stop reason: HOSPADM

## 2024-07-02 RX ORDER — INSULIN LISPRO 100 [IU]/ML
0-8 INJECTION, SOLUTION INTRAVENOUS; SUBCUTANEOUS
Status: DISCONTINUED | OUTPATIENT
Start: 2024-07-02 | End: 2024-07-09 | Stop reason: HOSPADM

## 2024-07-02 RX ORDER — ONDANSETRON 2 MG/ML
4 INJECTION INTRAMUSCULAR; INTRAVENOUS EVERY 6 HOURS PRN
Status: DISCONTINUED | OUTPATIENT
Start: 2024-07-02 | End: 2024-07-09 | Stop reason: HOSPADM

## 2024-07-02 RX ORDER — INSULIN GLARGINE 100 [IU]/ML
30 INJECTION, SOLUTION SUBCUTANEOUS 2 TIMES DAILY
Status: DISCONTINUED | OUTPATIENT
Start: 2024-07-02 | End: 2024-07-06

## 2024-07-02 RX ORDER — ONDANSETRON 4 MG/1
4 TABLET, ORALLY DISINTEGRATING ORAL EVERY 8 HOURS PRN
Status: DISCONTINUED | OUTPATIENT
Start: 2024-07-02 | End: 2024-07-02 | Stop reason: SDUPTHER

## 2024-07-02 RX ORDER — ATORVASTATIN CALCIUM 20 MG/1
20 TABLET, FILM COATED ORAL DAILY
Status: DISCONTINUED | OUTPATIENT
Start: 2024-07-02 | End: 2024-07-09 | Stop reason: HOSPADM

## 2024-07-02 RX ORDER — SODIUM CHLORIDE 0.9 % (FLUSH) 0.9 %
5-40 SYRINGE (ML) INJECTION PRN
Status: DISCONTINUED | OUTPATIENT
Start: 2024-07-02 | End: 2024-07-09 | Stop reason: HOSPADM

## 2024-07-02 RX ORDER — LABETALOL HYDROCHLORIDE 5 MG/ML
10 INJECTION, SOLUTION INTRAVENOUS EVERY 6 HOURS PRN
Status: DISCONTINUED | OUTPATIENT
Start: 2024-07-02 | End: 2024-07-03

## 2024-07-02 RX ORDER — ACETAMINOPHEN 650 MG/1
650 SUPPOSITORY RECTAL EVERY 6 HOURS PRN
Status: DISCONTINUED | OUTPATIENT
Start: 2024-07-02 | End: 2024-07-09 | Stop reason: HOSPADM

## 2024-07-02 RX ORDER — INSULIN LISPRO 100 [IU]/ML
0-4 INJECTION, SOLUTION INTRAVENOUS; SUBCUTANEOUS NIGHTLY
Status: DISCONTINUED | OUTPATIENT
Start: 2024-07-02 | End: 2024-07-09 | Stop reason: HOSPADM

## 2024-07-02 RX ORDER — LANOLIN ALCOHOL/MO/W.PET/CERES
3 CREAM (GRAM) TOPICAL NIGHTLY PRN
Status: DISCONTINUED | OUTPATIENT
Start: 2024-07-02 | End: 2024-07-09 | Stop reason: HOSPADM

## 2024-07-02 RX ORDER — ACETAMINOPHEN 325 MG/1
650 TABLET ORAL EVERY 6 HOURS PRN
Status: DISCONTINUED | OUTPATIENT
Start: 2024-07-02 | End: 2024-07-09 | Stop reason: HOSPADM

## 2024-07-02 RX ORDER — GLUCAGON 1 MG/ML
1 KIT INJECTION PRN
Status: DISCONTINUED | OUTPATIENT
Start: 2024-07-02 | End: 2024-07-09 | Stop reason: HOSPADM

## 2024-07-02 RX ORDER — OXYCODONE HYDROCHLORIDE 5 MG/1
5 TABLET ORAL ONCE
Status: DISCONTINUED | OUTPATIENT
Start: 2024-07-02 | End: 2024-07-09 | Stop reason: HOSPADM

## 2024-07-02 RX ORDER — LOSARTAN POTASSIUM AND HYDROCHLOROTHIAZIDE 25; 100 MG/1; MG/1
1 TABLET ORAL DAILY
Status: DISCONTINUED | OUTPATIENT
Start: 2024-07-02 | End: 2024-07-02

## 2024-07-02 RX ORDER — POLYETHYLENE GLYCOL 3350 17 G/17G
17 POWDER, FOR SOLUTION ORAL DAILY PRN
Status: DISCONTINUED | OUTPATIENT
Start: 2024-07-02 | End: 2024-07-04

## 2024-07-02 RX ORDER — ONDANSETRON 2 MG/ML
4 INJECTION INTRAMUSCULAR; INTRAVENOUS EVERY 6 HOURS PRN
Status: DISCONTINUED | OUTPATIENT
Start: 2024-07-02 | End: 2024-07-02 | Stop reason: SDUPTHER

## 2024-07-02 RX ORDER — CARVEDILOL 12.5 MG/1
25 TABLET ORAL DAILY
Status: DISCONTINUED | OUTPATIENT
Start: 2024-07-02 | End: 2024-07-09 | Stop reason: HOSPADM

## 2024-07-02 RX ADMIN — INSULIN GLARGINE 30 UNITS: 100 INJECTION, SOLUTION SUBCUTANEOUS at 20:48

## 2024-07-02 RX ADMIN — SODIUM CHLORIDE 1000 MG: 900 INJECTION INTRAVENOUS at 19:47

## 2024-07-02 RX ADMIN — LOSARTAN POTASSIUM 100 MG: 50 TABLET, FILM COATED ORAL at 03:43

## 2024-07-02 RX ADMIN — SODIUM CHLORIDE, PRESERVATIVE FREE 10 ML: 5 INJECTION INTRAVENOUS at 21:24

## 2024-07-02 RX ADMIN — ONDANSETRON 4 MG: 2 INJECTION INTRAMUSCULAR; INTRAVENOUS at 19:42

## 2024-07-02 RX ADMIN — INSULIN LISPRO 2 UNITS: 100 INJECTION, SOLUTION INTRAVENOUS; SUBCUTANEOUS at 17:09

## 2024-07-02 RX ADMIN — HYDROCHLOROTHIAZIDE 25 MG: 25 TABLET ORAL at 09:17

## 2024-07-02 RX ADMIN — LABETALOL HYDROCHLORIDE 10 MG: 5 INJECTION, SOLUTION INTRAVENOUS at 09:18

## 2024-07-02 RX ADMIN — ATORVASTATIN CALCIUM 20 MG: 20 TABLET, FILM COATED ORAL at 09:17

## 2024-07-02 RX ADMIN — ENOXAPARIN SODIUM 40 MG: 100 INJECTION SUBCUTANEOUS at 09:18

## 2024-07-02 RX ADMIN — CARVEDILOL 25 MG: 12.5 TABLET, FILM COATED ORAL at 09:17

## 2024-07-02 RX ADMIN — ASPIRIN 81 MG: 81 TABLET, COATED ORAL at 09:17

## 2024-07-02 RX ADMIN — INSULIN GLARGINE 30 UNITS: 100 INJECTION, SOLUTION SUBCUTANEOUS at 09:18

## 2024-07-02 RX ADMIN — LORAZEPAM 1 MG: 2 INJECTION INTRAMUSCULAR; INTRAVENOUS at 15:15

## 2024-07-02 RX ADMIN — INSULIN GLARGINE 30 UNITS: 100 INJECTION, SOLUTION SUBCUTANEOUS at 01:04

## 2024-07-02 RX ADMIN — SODIUM CHLORIDE, PRESERVATIVE FREE 10 ML: 5 INJECTION INTRAVENOUS at 09:18

## 2024-07-02 ASSESSMENT — PAIN SCALES - GENERAL: PAINLEVEL_OUTOF10: 0

## 2024-07-02 NOTE — CARE COORDINATION
RUR: 11%  Readmission? No  IM? Yes - 1st IM given on 7/2  ? N/A     Transition of Care Plan:   Plan A: SNF  Plan B: Home with home health services     CM met with pt's sons at bedside to complete initial case management assessment. Pt's sons confirmed that they can transport pt home from the hospital as long as she is able to get inside Cruz's truck.     Pharmacy: [id number] - [place], [place] - [address] - P [phone] - F [phone] [[id number]]     Pt lives alone at the address listed on her facesheet. Pt's son, Cruz, lives close by. Pt's son, Darrius, lives in [place] and is pt's mPOA and primary healthcare decision maker on ACP. Darrius has an ACP document to provide to the hospital and to scan to pt's chart. Pt has a cane, walker, wheelchair, and Life Alert at home. Prior to the past couple of weeks, pt was independent in ADLS/IADLs. Pt's sons report that pt has declined in the past couple of weeks and has fallen multiple times. Pt has not been able to get up when she falls. Pt is currently receiving home health services from [facility]. "[employer] was going to send an aid to assist pt with bathing. Pt does not have a hx of home O2 or a stay at a SNF for rehab services.     CM discussed PT/OT recommendations that pt discharge to a SNF for rehab services with pt's sons. Pt's son are agreeable to this plan. FOC discussed. Pt's sons would like her to be referred to [facility] [place] [place]. 2nd and 3rd choices not provided at this time. Family considering options. CM provided pt's sons with list of SNF options in [facility].     Pt's sons feel that pt could benefit from a service that assists with home-delivered or prepared meals. Pt has a diagnosis of diabetes. Case management to following for discharge planning.    1623: CM called [facility] at [phone] to inquire how to send a SNF referral ([facility] could not find [facility]

## 2024-07-02 NOTE — PROGRESS NOTES
Bedside shift report giving to MAYDA Samuels, report included recent findins and labs. Patient had no complaint of pain this shift. Patient did have a complaint of nausea and Zofran offered but patient refused medication (son made aware).MRI screening was done with patient and son.

## 2024-07-02 NOTE — H&P
Hospitalist Admission Note    NAME:  Ann Turner   :  1950   MRN:  [id number]     Date/Time:  2024 12:37 AM    Patient PCP: Jethro Amos MD    ______________________________________________________________________  Given the patient's current clinical presentation, I have a high level of concern for decompensation if discharged from the emergency department.  Complex decision making was performed, which includes reviewing the patient's available past medical records, laboratory results, and x-ray films.       My assessment of this patient's clinical condition and my plan of care is as follows.    Assessment / Plan:    Active Problems:  Gait instability  Left greater than right muscle weakness-history CVA  Radiographic concern for NPH  Essential hypertension  Hyperlipidemia  Diabetes mellitus  Baseline urinary incontinence asymptomatic bacteriuria    CKD 3, at baseline    Plan:  Gait instability  Left greater than right muscle weakness-history CVA  Radiographic concern for NPH  Essential hypertension  Hyperlipidemia  Admit to telemetry monitoring  Neurology consulted, greatly appreciate their expertise  -Fairly low suspicion for CVA or NPH-defer imaging/LP appropriateness to specialist  Continue PTA aspirin, atorvastatin, Coreg, losartan hydrochlorothiazide  PT/OT consulted  Case management consulted for SNF placement  TIA/CVA order set utilized out of abundance of caution  -NIHSS 0    Diabetes mellitus  Decrease PTA glargine to 30 units twice daily  Of coverage insulin  Accu-Cheks  Diabetic diet  Hypoglycemia protocol in place    Baseline urinary incontinence  Unchanged-continue outpatient management  Bladder management protocol in place    Asymptomatic bacteriuria  UA notable for 2+ bacteria and positive nitrates with trace leuk esterase (0-4 WBC) not reflexed to culture  -Add on urine culture  1 g IV ceftriaxone given in ED-will hold off on further doses given patient asymptomatic with low

## 2024-07-02 NOTE — ED NOTES
Arvind on Med/Tele was given report on the patient. Meds were discussed as well as code status and next steps in her plan of care.

## 2024-07-02 NOTE — PROGRESS NOTES
Mri screening sheet needs to be completed and signed    Please call [phone] when this is done      Fax [phone]

## 2024-07-02 NOTE — ACP (ADVANCE CARE PLANNING)
Advance Care Planning Note      NAME: Ann Turner   :  1950   MRN:  [id number]     Date/Time:  2024 2:54 PM    Active Diagnoses:    Gait instability  Left greater than right muscle weakness-history CVA  Radiographic concern for NPH  Essential hypertension  Hyperlipidemia  Diabetes mellitus  Baseline urinary incontinence asymptomatic bacteriuria  CKD 3, at baseline      These active diagnoses are of sufficient risk that focused discussion on advance care planning is indicated in order to allow the patient to thoughtfully consider personal goals of care, and if situations arise that prevent the ability to personally give input, to ensure appropriate representation of their personal desires for different levels and aggressiveness of care.     Discussion:   Code status addressed and wants to be a Full Code.  Patient wants central line and vasopressors if needed.   Patient  would like to assign Dedrick rizo  as the surrogate decision maker.    Persons present and participating in discussion: Abdelrahman Sidhu MD, Dedrick Rizo.       Time Spent:   Total time spent face-to-face in education and discussion:   16   minutes.         Abdelrahman Camara MD   Hospitalist

## 2024-07-02 NOTE — PLAN OF CARE
Activity Tolerance:   Poor and limited by nausea    After treatment:   Patient left in no apparent distress in bed, Call bell within reach, Bed/ chair alarm activated, and Side rails x3    COMMUNICATION/EDUCATION:   The patient's plan of care was discussed with: physical therapist and registered nurse         Thank you for this referral.  Chery Baird OT  Minutes: 16    Occupational Therapy Evaluation Charge Determination   History Examination Decision-Making   LOW Complexity : Brief history review  MEDIUM Complexity: 3-5 Performance deficits relating to physical, cognitive, or psychosocial skills that result in activity limitations and/or participation restrictions MEDIUM Complexity: Patient may present with comorbidities that affect occupational performance. Minimal to moderate modifications of tasks or assist (eg. physical or verbal) with assist is necessary to enable pt to complete eval   Based on the above components, the patient evaluation is determined to be of the following complexity level: Low

## 2024-07-02 NOTE — CONSULTS
CONSULT - Neurology      Name:  Ann Turner       MRN: [id number]  Location: 3243/01    Date: 2024  Time:  9:24 AM        Chief Complaint:   Chief Complaint   Patient presents with    Extremity Weakness     Arrives to triage in wheelchair c/o BLE weakness since Friday, L worse than R. Pt states Friday morning her LLE gave out from under her and caused her to fall and land on her tailbone, which is now painful. Since fall pt has generally felt weak and dizzy.       HPI:  It is a great pleasure to see Ann Turner, a 73 y.o. female today in the hospital . Briefly these are the events happened as per the chart and taken from the chart.  The patient was doing fine and the symptoms started with . progressively worsening bilateral lower extremity weakness and gait instability  in the past month. Patient reports most recent fall occurred 3 days prior falling onto her buttock . At baseline ambulates with walker. Has history of CVA with residual left-sided weakness which is cause for need for walker. Did not strike her head or lose consciousness. The symptoms fluctuated in the beginning. There were no aggravating and relieving factors. The patient was brought to the emergency room for further evaluation.       PAST MEDICAL HISTORY:  Past Medical History:   Diagnosis Date    Chronic obstructive pulmonary disease (HCC)     CVA (cerebral infarction) 7/15/2014    HTN (hypertension)     Hyperlipidemia 7/15/2014    Nausea & vomiting     Type 2 diabetes mellitus (HCC)      PAST SURGICAL HISTORY:    Past Surgical History:   Procedure Laterality Date     SECTION      x 2    HYSTERECTOMY (CERVIX STATUS UNKNOWN)       FAMILY HISTORY:    Family History   Problem Relation Age of Onset    Cancer Mother     Hypertension Father      SOCIAL HISTORY:   Social History     Tobacco Use    Smoking status: Former     Current packs/day: 0.00     Types: Cigarettes     Quit date: 2014     Years since quitting: 10.5    Smokeless  with intact sensation. Hearing intact to finger rub bilaterally. Tongue & uvula midline. Palate elevates symmetrically. Symmetric shoulder shrug.  Motor: No drift. Normal tone, bulk and strength 4/5 in bilateral upper & 3/5 lower extremities  Sensory: Grossly intact to LT.  Coord: Normal FTN testing      STUDIES:  LABS:   Last 24 hours of labs are   Recent Results (from the past 24 hour(s))   EKG 12 Lead    Collection Time: 07/01/24  2:58 PM   Result Value Ref Range    Ventricular Rate 76 BPM    Atrial Rate 76 BPM    P-R Interval 200 ms    QRS Duration 76 ms    Q-T Interval 380 ms    QTc Calculation (Bazett) 427 ms    P Axis 68 degrees    R Axis 31 degrees    T Axis 82 degrees    Diagnosis       Normal sinus rhythm  Nonspecific T wave abnormality  No previous ECGs available     CBC with Auto Differential    Collection Time: 07/01/24  3:29 PM   Result Value Ref Range    WBC 8.4 3.6 - 11.0 K/uL    RBC 4.54 3.80 - 5.20 M/uL    Hemoglobin 12.5 11.5 - 16.0 g/dL    Hematocrit 39.1 35.0 - 47.0 %    MCV 86.1 80.0 - 99.0 FL    MCH 27.5 26.0 - 34.0 PG    MCHC 32.0 30.0 - 36.5 g/dL    RDW 13.2 11.5 - 14.5 %    Platelets 202 150 - 400 K/uL    MPV 10.8 8.9 - 12.9 FL    Nucleated RBCs 0.0 0  WBC    nRBC 0.00 0.00 - 0.01 K/uL    Neutrophils % 62 32 - 75 %    Lymphocytes % 26 12 - 49 %    Monocytes % 8 5 - 13 %    Eosinophils % 3 0 - 7 %    Basophils % 1 0 - 1 %    Immature Granulocytes % 0 0.0 - 0.5 %    Neutrophils Absolute 5.2 1.8 - 8.0 K/UL    Lymphocytes Absolute 2.2 0.8 - 3.5 K/UL    Monocytes Absolute 0.7 0.0 - 1.0 K/UL    Eosinophils Absolute 0.3 0.0 - 0.4 K/UL    Basophils Absolute 0.1 0.0 - 0.1 K/UL    Immature Granulocytes Absolute 0.0 0.00 - 0.04 K/UL    Differential Type AUTOMATED     Comprehensive Metabolic Panel w/ Reflex to MG    Collection Time: 07/01/24  3:29 PM   Result Value Ref Range    Sodium 137 136 - 145 mmol/L    Potassium 3.9 3.5 - 5.1 mmol/L    Chloride 103 97 - 108 mmol/L    CO2 28 21 - 32 mmol/L

## 2024-07-02 NOTE — PLAN OF CARE
impairments.         PLAN :  Recommendations and Planned Interventions:   bed mobility training, transfer training, gait training, therapeutic exercises, neuromuscular re-education, patient and family training/education, and therapeutic activities    Frequency/Duration: Patient will be followed by physical therapy to address goals, PT Plan of Care: 4 times/week to address goals.        Recommendation for discharge: (in order for the patient to meet his/her long term goals): Therapy up to 5 days/week in Skilled nursing facility    Other factors to consider for discharge: high risk for falls, not safe to be alone, and concern for safely navigating or managing the home environment    IF patient discharges home will need the following DME: continuing to assess with progress                SUBJECTIVE:   Patient stated “I'd rather they just consult hospice and get it over with it.  This is no way to live.”    OBJECTIVE DATA SUMMARY:       Past Medical History:   Diagnosis Date    Chronic obstructive pulmonary disease (HCC)     CVA (cerebral infarction) 7/15/2014    HTN (hypertension)     Hyperlipidemia 7/15/2014    Nausea & vomiting     Type 2 diabetes mellitus (HCC)      Past Surgical History:   Procedure Laterality Date     SECTION      x 2    HYSTERECTOMY (CERVIX STATUS UNKNOWN)         Home Situation:  Social/Functional History  Lives With: Alone  Type of Home: House  Home Layout: One level  Home Access: Stairs to enter with rails  Home Equipment: Rollator, Walker - Rolling    Cognitive/Behavioral Status:  Orientation  Orientation Level: Oriented X4            Strength:    Strength: Generally decreased, functional    Tone & Sensation:   Tone: Normal  Sensation: Intact    Coordination:  Coordination: Within functional limits    Range Of Motion:  AROM: Generally decreased, functional  PROM: Generally decreased, functional    Functional Mobility:  Bed Mobility:     Bed Mobility Training  Bed Mobility Training:  Yes  Rolling: Supervision;Adaptive equipment;Additional time  Supine to Sit: Minimum assistance;Assist X2 (comes to partial sit only)  Sit to Supine: Supervision  Scooting: Total assistance (via bed mechanics)  Transfers:        Balance:                  Ambulation/Gait Training:                                                         Pain Ratin/10   Pain Intervention(s):       Activity Tolerance:   Fair  and requires rest breaks    After treatment:   Patient left in no apparent distress in bed, Call bell within reach, and Side rails x3    COMMUNICATION/EDUCATION:   The patient's plan of care was discussed with: physical therapist, occupational therapist, and registered nurse         Thank you for this referral.  Chery Pa, PT  Minutes: 16

## 2024-07-02 NOTE — PROGRESS NOTES
Nursing contacted Nocturnist/cross cover provider via non-urgent messaging system [employer] and notified patient states tailbone pain, s/p fall few days ago, states \"I think I broke it\", asking for pain meds and imaging evaluation, states pain 5/10, also bp 191/63 on recheck, states son at bedside reported pt didn't receive her po meds for bp yesterday, asking to have bp meds now. No other concerns reported. No acute distress reported. No other information provided by nurse. VSS otherwise. Ordered po bp meds from home to start now, xry stat r/o injury sacral/coccyx, stat xry edna pel/hip- pending, oxy 5mg po x1. Likely some of the htn can be attributed to pain. Will do pel/hip xray also to r/o any radiating pain issues, although no issues w/ wt bearing were reported. Will defer further evaluation/management to the day shift primary attending care team. Patient denies any further complaints or concerns. Nursing to notify Hospitalist for further/continued concerns. Will remain available overnight for further concerns if nursing/patient needs. Please note, there are RRT systems in this hospital in place that if nursing has acute or critical patient condition change or concern, this is to help facilitate and notify that patient needs immediate bedside evaluation by a provider.     Non-billable note.

## 2024-07-02 NOTE — PLAN OF CARE
Problem: Discharge Planning  Goal: Discharge to home or other facility with appropriate resources  7/2/2024 1132 by Samantha Sterling, RN  Outcome: Progressing  7/2/2024 0642 by Bhavna Guerra, RN  Outcome: Progressing

## 2024-07-03 ENCOUNTER — TELEPHONE (OUTPATIENT)
Age: 74
End: 2024-07-03

## 2024-07-03 LAB
ANION GAP SERPL CALC-SCNC: 6 MMOL/L (ref 5–15)
BASOPHILS # BLD: 0.1 K/UL (ref 0–0.1)
BASOPHILS NFR BLD: 1 % (ref 0–1)
BUN SERPL-MCNC: 29 MG/DL (ref 6–20)
BUN/CREAT SERPL: 29 (ref 12–20)
CALCIUM SERPL-MCNC: 9.4 MG/DL (ref 8.5–10.1)
CHLORIDE SERPL-SCNC: 104 MMOL/L (ref 97–108)
CHOLEST SERPL-MCNC: 165 MG/DL
CO2 SERPL-SCNC: 26 MMOL/L (ref 21–32)
CREAT SERPL-MCNC: 0.99 MG/DL (ref 0.55–1.02)
DIFFERENTIAL METHOD BLD: ABNORMAL
EOSINOPHIL # BLD: 0.3 K/UL (ref 0–0.4)
EOSINOPHIL NFR BLD: 4 % (ref 0–7)
ERYTHROCYTE [DISTWIDTH] IN BLOOD BY AUTOMATED COUNT: 13.1 % (ref 11.5–14.5)
EST. AVERAGE GLUCOSE BLD GHB EST-MCNC: 192 MG/DL
GLUCOSE BLD STRIP.AUTO-MCNC: 109 MG/DL (ref 65–117)
GLUCOSE BLD STRIP.AUTO-MCNC: 199 MG/DL (ref 65–117)
GLUCOSE BLD STRIP.AUTO-MCNC: 222 MG/DL (ref 65–117)
GLUCOSE BLD STRIP.AUTO-MCNC: 233 MG/DL (ref 65–117)
GLUCOSE SERPL-MCNC: 126 MG/DL (ref 65–100)
HBA1C MFR BLD: 8.3 % (ref 4–5.6)
HCT VFR BLD AUTO: 37.9 % (ref 35–47)
HDLC SERPL-MCNC: 50 MG/DL
HDLC SERPL: 3.3 (ref 0–5)
HGB BLD-MCNC: 12.3 G/DL (ref 11.5–16)
IMM GRANULOCYTES # BLD AUTO: 0 K/UL (ref 0–0.04)
IMM GRANULOCYTES NFR BLD AUTO: 1 % (ref 0–0.5)
LDLC SERPL CALC-MCNC: 71.2 MG/DL (ref 0–100)
LYMPHOCYTES # BLD: 2.1 K/UL (ref 0.8–3.5)
LYMPHOCYTES NFR BLD: 28 % (ref 12–49)
MAGNESIUM SERPL-MCNC: 2 MG/DL (ref 1.6–2.4)
MCH RBC QN AUTO: 27.5 PG (ref 26–34)
MCHC RBC AUTO-ENTMCNC: 32.5 G/DL (ref 30–36.5)
MCV RBC AUTO: 84.6 FL (ref 80–99)
MONOCYTES # BLD: 0.7 K/UL (ref 0–1)
MONOCYTES NFR BLD: 9 % (ref 5–13)
NEUTS SEG # BLD: 4.6 K/UL (ref 1.8–8)
NEUTS SEG NFR BLD: 57 % (ref 32–75)
NRBC # BLD: 0 K/UL (ref 0–0.01)
NRBC BLD-RTO: 0 PER 100 WBC
PLATELET # BLD AUTO: 175 K/UL (ref 150–400)
PMV BLD AUTO: 10.9 FL (ref 8.9–12.9)
POTASSIUM SERPL-SCNC: 3.5 MMOL/L (ref 3.5–5.1)
RBC # BLD AUTO: 4.48 M/UL (ref 3.8–5.2)
SERVICE CMNT-IMP: ABNORMAL
SERVICE CMNT-IMP: NORMAL
SODIUM SERPL-SCNC: 136 MMOL/L (ref 136–145)
TRIGL SERPL-MCNC: 219 MG/DL
VLDLC SERPL CALC-MCNC: 43.8 MG/DL
WBC # BLD AUTO: 7.7 K/UL (ref 3.6–11)

## 2024-07-03 PROCEDURE — 97530 THERAPEUTIC ACTIVITIES: CPT

## 2024-07-03 PROCEDURE — 6360000002 HC RX W HCPCS: Performed by: INTERNAL MEDICINE

## 2024-07-03 PROCEDURE — 6370000000 HC RX 637 (ALT 250 FOR IP): Performed by: NURSE PRACTITIONER

## 2024-07-03 PROCEDURE — 85025 COMPLETE CBC W/AUTO DIFF WBC: CPT

## 2024-07-03 PROCEDURE — 6370000000 HC RX 637 (ALT 250 FOR IP): Performed by: STUDENT IN AN ORGANIZED HEALTH CARE EDUCATION/TRAINING PROGRAM

## 2024-07-03 PROCEDURE — 80048 BASIC METABOLIC PNL TOTAL CA: CPT

## 2024-07-03 PROCEDURE — 6360000002 HC RX W HCPCS: Performed by: STUDENT IN AN ORGANIZED HEALTH CARE EDUCATION/TRAINING PROGRAM

## 2024-07-03 PROCEDURE — 2580000003 HC RX 258: Performed by: STUDENT IN AN ORGANIZED HEALTH CARE EDUCATION/TRAINING PROGRAM

## 2024-07-03 PROCEDURE — 97116 GAIT TRAINING THERAPY: CPT

## 2024-07-03 PROCEDURE — 1100000003 HC PRIVATE W/ TELEMETRY

## 2024-07-03 PROCEDURE — 82962 GLUCOSE BLOOD TEST: CPT

## 2024-07-03 PROCEDURE — 83036 HEMOGLOBIN GLYCOSYLATED A1C: CPT

## 2024-07-03 PROCEDURE — 83735 ASSAY OF MAGNESIUM: CPT

## 2024-07-03 PROCEDURE — 2580000003 HC RX 258: Performed by: INTERNAL MEDICINE

## 2024-07-03 PROCEDURE — 99233 SBSQ HOSP IP/OBS HIGH 50: CPT

## 2024-07-03 PROCEDURE — 80061 LIPID PANEL: CPT

## 2024-07-03 PROCEDURE — 36415 COLL VENOUS BLD VENIPUNCTURE: CPT

## 2024-07-03 RX ORDER — LABETALOL HYDROCHLORIDE 5 MG/ML
20 INJECTION, SOLUTION INTRAVENOUS EVERY 6 HOURS PRN
Status: DISCONTINUED | OUTPATIENT
Start: 2024-07-03 | End: 2024-07-09 | Stop reason: HOSPADM

## 2024-07-03 RX ADMIN — LOSARTAN POTASSIUM 100 MG: 50 TABLET, FILM COATED ORAL at 10:07

## 2024-07-03 RX ADMIN — ENOXAPARIN SODIUM 40 MG: 100 INJECTION SUBCUTANEOUS at 10:09

## 2024-07-03 RX ADMIN — INSULIN GLARGINE 30 UNITS: 100 INJECTION, SOLUTION SUBCUTANEOUS at 10:09

## 2024-07-03 RX ADMIN — LABETALOL HYDROCHLORIDE 10 MG: 5 INJECTION, SOLUTION INTRAVENOUS at 10:02

## 2024-07-03 RX ADMIN — CARVEDILOL 25 MG: 12.5 TABLET, FILM COATED ORAL at 10:09

## 2024-07-03 RX ADMIN — SODIUM CHLORIDE, PRESERVATIVE FREE 10 ML: 5 INJECTION INTRAVENOUS at 10:10

## 2024-07-03 RX ADMIN — ACETAMINOPHEN 650 MG: 325 TABLET ORAL at 19:42

## 2024-07-03 RX ADMIN — INSULIN LISPRO 2 UNITS: 100 INJECTION, SOLUTION INTRAVENOUS; SUBCUTANEOUS at 17:06

## 2024-07-03 RX ADMIN — INSULIN GLARGINE 30 UNITS: 100 INJECTION, SOLUTION SUBCUTANEOUS at 20:52

## 2024-07-03 RX ADMIN — ATORVASTATIN CALCIUM 20 MG: 20 TABLET, FILM COATED ORAL at 10:07

## 2024-07-03 RX ADMIN — SODIUM CHLORIDE: 9 INJECTION, SOLUTION INTRAVENOUS at 20:51

## 2024-07-03 RX ADMIN — INSULIN LISPRO 2 UNITS: 100 INJECTION, SOLUTION INTRAVENOUS; SUBCUTANEOUS at 13:38

## 2024-07-03 RX ADMIN — SODIUM CHLORIDE 1000 MG: 900 INJECTION INTRAVENOUS at 20:52

## 2024-07-03 RX ADMIN — SODIUM CHLORIDE, PRESERVATIVE FREE 10 ML: 5 INJECTION INTRAVENOUS at 20:54

## 2024-07-03 RX ADMIN — HYDROCHLOROTHIAZIDE 25 MG: 25 TABLET ORAL at 10:07

## 2024-07-03 RX ADMIN — ASPIRIN 81 MG: 81 TABLET, COATED ORAL at 10:09

## 2024-07-03 RX ADMIN — LABETALOL HYDROCHLORIDE 20 MG: 5 INJECTION, SOLUTION INTRAVENOUS at 20:46

## 2024-07-03 ASSESSMENT — PAIN SCALES - GENERAL
PAINLEVEL_OUTOF10: 7
PAINLEVEL_OUTOF10: 0
PAINLEVEL_OUTOF10: 7
PAINLEVEL_OUTOF10: 0

## 2024-07-03 ASSESSMENT — PAIN DESCRIPTION - LOCATION
LOCATION: NECK
LOCATION: NECK

## 2024-07-03 ASSESSMENT — PAIN DESCRIPTION - ORIENTATION: ORIENTATION: RIGHT

## 2024-07-03 NOTE — CARE COORDINATION
Transition of Care Plan:    RUR: 12%  Prior Level of Functioning: independent   Disposition: SNF placement- choice pending   If SNF or IPR: Date FOC offered: 7/2  Date FOC received: 7/2- need additional choice  Accepting facility: pending  Date authorization started with reference number: will need auth   Date authorization received and expires:   Follow up appointments: after rehab   DME needed: available at facility   Transportation at discharge: medical transport   IM/IMM Medicare/ letter given: to be given   Is patient a  and connected with VA? no   If yes, was  transfer form completed and VA notified?   Caregiver Contact: son  Discharge Caregiver contacted prior to discharge? yes  Care Conference needed? no  Barriers to discharge:  medical clearance, placement, auth    Chart reviewed. [facility] has declined pt for rehab placement. Met with pt and son at bedside to obtain additional choice. Son is now reviewing facilities in [facility] as he lives near [place]. Will follow up in AM.     SAVANNA Mckeon   Care Manager, [facility]  [phone]

## 2024-07-03 NOTE — TELEPHONE ENCOUNTER
Hello,    Can this patient be scheduled for hospital follow-up in 4 to 8 weeks with any other neurology providers?    Thanks,  Shonna

## 2024-07-03 NOTE — PROGRESS NOTES
Chart reviewed for PCP hospital follow up. Patient's current ERI is 7/4/24. Patient is currently recommended for SNF. Pending patient discharge.

## 2024-07-03 NOTE — PROGRESS NOTES
HOSPITAL NEUROLOGY NOTE     Chief Complaint   Patient presents with    Extremity Weakness     Arrives to triage in wheelchair c/o BLE weakness since Friday, L worse than R. Pt states Friday morning her LLE gave out from under her and caused her to fall and land on her tailbone, which is now painful. Since fall pt has generally felt weak and dizzy.        HPI  History excerpted from H&P note on 7/2/2024  \"Ann Turner is a 73 y.o. female  with PMHx as listed below presenting to the emergency department accompanied by son with concerns for progressively worsening bilateral lower extremity weakness and gait instability with at least 4 weakness related falls in the past month.  Patient reports most recent fall occurred 3 days prior falling onto her buttock with consequent \"tailbone pain\" since that time has been very reluctant to move around and is no longer able to ambulate safely or rise from seated position.  At baseline ambulates with walker.  Has history of CVA with residual left-sided weakness which is cause for need for walker.  Reports that her legs gave out from under her and she experience bilateral weakness stating that the left leg was more weak than the right.  Denies other associated symptoms.  Did not strike her head or lose consciousness.  Does experience intermittent episodes of lightheadedness with positional change.  Reports good p.o. intake.  Denies increasing confusion or altered mental status-confirmed with son at bedside.  Has chronic urinary incontinence which is unchanged.  ROS otherwise negative.  Denies tobacco, alcohol, illicit drugs.  No recent medication changes.     In the ED, patient afebrile and hemodynamically stable (hypertensive 140s/60s), saturating mid 90s on room air.  ECG demonstrates normal sinus rhythm without definitive ischemic change.  CT head demonstrates lateral and third ventricle prominence slightly out of proportion to sulcal likely reflecting atrophy noting that NPH

## 2024-07-03 NOTE — PLAN OF CARE
Problem: Physical Therapy - Adult  Goal: By Discharge: Performs mobility at highest level of function for planned discharge setting.  See evaluation for individualized goals.  Description: FUNCTIONAL STATUS PRIOR TO ADMISSION: Patient was independent and active without use of DME.  Reports decline in her mobility for the past few weeks with 4-5 falls at home.    HOME SUPPORT PRIOR TO ADMISSION: The patient lived alone and has a son that lives in the area who can provide some intermittent assistance.    Physical Therapy Goals  Initiated 7/2/2024  1.  Patient will move from supine to sit and sit to supine in bed with supervision/set-up within 7 day(s).    2.  Patient will perform sit to stand with contact guard assist within 7 day(s).  3.  Patient will transfer from bed to chair and chair to bed with contact guard assist using the least restrictive device within 7 day(s).  4.  Patient will ambulate with contact guard assist for 150 feet with the least restrictive device within 7 day(s).     Outcome: Progressing  PHYSICAL THERAPY TREATMENT    Patient: Ann Turner (73 y.o. female)  Date: 7/3/2024  Diagnosis: Unsteady gait [R26.81]  Gait instability [R26.81]  Bilateral leg weakness [R29.898]  Acute cystitis without hematuria [N30.00]  Recurrent falls [R29.6] Unsteady gait      Precautions: Fall Risk                      ASSESSMENT:  Patient continues to benefit from skilled PT services and is progressing towards goals. She demonstrates fair sitting balance today. Patient requires Min A to achieve standing balance. She demonstrates increased trunk sway and uneven stance. She has increased difficulty weight shifting and progressing her L LE. She is able to transfer on and off bedside commode with Min A. She demonstrates poor unsupported standing balance requiring Max A to maintain upright standing while managing her depends. Patient is left in bedside chair with bilateral LE elevated.          PLAN:  Patient continues  to benefit from skilled intervention to address the above impairments.  Continue treatment per established plan of care.        Recommendation for discharge: (in order for the patient to meet his/her long term goals): Therapy up to 5 days/week in Skilled nursing facility    Other factors to consider for discharge: lives alone, available support system works or is unable to provide adequate supervision and the patient would be alone, high risk for falls, not safe to be alone, and concern for safely navigating or managing the home environment    IF patient discharges home will need the following DME: continuing to assess with progress       SUBJECTIVE:   Patient stated, \".\"    OBJECTIVE DATA SUMMARY:   Critical Behavior:          Functional Mobility Training:  Bed Mobility:  Bed Mobility Training  Supine to Sit: Minimum assistance  Scooting: Stand-by assistance  Transfers:  Transfer Training  Transfer Training: Yes  Sit to Stand: Minimum assistance  Stand to Sit: Minimum assistance  Bed to Chair: Moderate assistance;Adaptive equipment  Toilet Transfer: Moderate assistance;Adaptive equipment  Balance:  Balance  Sitting: Impaired  Sitting - Static: Fair (occasional)  Standing: Impaired  Standing - Static: Constant support;Fair  Standing - Dynamic: Poor;Fair;Constant support   Ambulation/Gait Training:              Neuro Re-Education:                      Pain Intervention(s):       Activity Tolerance:   Fair     After treatment:   Patient left in no apparent distress sitting up in chair, Call bell within reach, Bed/ chair alarm activated, and Caregiver / family present      COMMUNICATION/EDUCATION:   The patient's plan of care was discussed with: registered nurse           Flor Arguello, PT  Minutes: 21

## 2024-07-03 NOTE — PROGRESS NOTES
Hospitalist Progress Note    NAME:   Ann Turner   : 1950   MRN: [id number]     Date/Time: 7/3/2024 7:09 PM  Patient PCP: Jethro Amos MD    Estimated discharge date:   Barriers: urine culture finalization      Assessment / Plan:      Gait instability  Left greater than right muscle weakness-history CVA  NPH  Mild cervical cord compression  Essential hypertension  Hyperlipidemia  Admit to telemetry monitoring  Neurology consulted, greatly appreciate their expertise  Continue PTA aspirin, atorvastatin, Coreg, losartan hydrochlorothiazide  PT/OT consulted  Case management consulted for SNF placement  TIA/CVA order set utilized out of abundance of caution  -NIHSS 0  - outpatient followup with Neurology regarding NPH, no urgent interventions needed  - discussed with NSGY regarding cervical and thoracic MRI, noted mild cord compression, no edema. No intervention needed at this time, outpatient NSGY followup    GNR UTI  - followup urine culture, continue CTX     Diabetes mellitus  Decrease PTA glargine to 30 units twice daily  Of coverage insulin  Accu-Cheks  Diabetic diet  Hypoglycemia protocol in place     Baseline urinary incontinence  Unchanged-continue outpatient management  Bladder management protocol in place     CKD 3, at baseline  Trend renal function  Renally dose medications and avoid nephrotoxic agents     Medical Decision Making:   I personally reviewed labs: cbc bmp urine culture  I personally reviewed imaging: MRI cervical/thoracic  Toxic drug monitoring: monitor platelets for thrombocytopenia from CTX toxicity  Discussed case with:  Neurology, Neurosurgery, CM, RN        Code Status: Full  DVT Prophylaxis: Lovenox  GI Prophylaxis: Not indicated  Baseline: Lives independently, ambulates with walker    Subjective:     Chief Complaint / Reason for Physician Visit  \"Followup gait unsteadiness\".  Discussed with RN events overnight. Feels ok today. No weakness or numbness. Last BM  yesterday, urinating without issue. No groin numbness.      Objective:     VITALS:   Last 24hrs VS reviewed since prior progress note. Most recent are:  Patient Vitals for the past 24 hrs:   BP Temp Temp src Pulse Resp SpO2 Weight   07/03/24 1845 (!) 169/70 -- -- 86 16 -- --   07/03/24 1532 119/60 98.2 °F (36.8 °C) Oral 76 16 95 % --   07/03/24 1237 (!) 144/58 -- -- 80 -- 91 % --   07/03/24 0935 (!) 194/62 -- -- -- -- -- --   07/03/24 0930 (!) 200/78 98.1 °F (36.7 °C) Oral 74 16 96 % --   07/03/24 0434 -- -- -- -- -- -- 63.8 kg (140 lb 9.6 oz)   07/03/24 0331 (!) 160/65 97.6 °F (36.4 °C) Axillary 85 15 93 % --   07/02/24 1932 (!) 147/65 97.5 °F (36.4 °C) Oral 78 18 94 % --       No intake or output data in the 24 hours ending 07/03/24 1909     I had a face to face encounter and independently examined this patient on 7/3/2024, as outlined below:  PHYSICAL EXAM:  General: Alert, cooperative  EENT:  EOMI. Anicteric sclerae.  Resp:  CTA bilaterally, no wheezing or rales.  No accessory muscle use  CV:  Regular  rate,  No edema  GI:  Soft, Non distended, Non tender.  +Bowel sounds  Neurologic:  Alert and oriented X 3, normal speech,   Skin:  No rashes.  No jaundice    Reviewed most current lab test results and cultures  YES  Reviewed most current radiology test results   YES  Review and summation of old records today    NO  Reviewed patient's current orders and MAR    YES  PMH/SH reviewed - no change compared to H&P    Procedures: see electronic medical records for all procedures/Xrays and details which were not copied into this note but were reviewed prior to creation of Plan.      LABS:  I reviewed today's most current labs and imaging studies.  Pertinent labs include:  Recent Labs     07/01/24  1529 07/03/24  0335   WBC 8.4 7.7   HGB 12.5 12.3   HCT 39.1 37.9    175     Recent Labs     07/01/24  1529 07/03/24  0335    136   K 3.9 3.5    104   CO2 28 26   GLUCOSE 122* 126*   BUN 35* 29*   CREATININE

## 2024-07-04 LAB
BACTERIA SPEC CULT: ABNORMAL
CC UR VC: ABNORMAL
GLUCOSE BLD STRIP.AUTO-MCNC: 163 MG/DL (ref 65–117)
GLUCOSE BLD STRIP.AUTO-MCNC: 187 MG/DL (ref 65–117)
GLUCOSE BLD STRIP.AUTO-MCNC: 236 MG/DL (ref 65–117)
GLUCOSE BLD STRIP.AUTO-MCNC: 317 MG/DL (ref 65–117)
SERVICE CMNT-IMP: ABNORMAL

## 2024-07-04 PROCEDURE — 1100000003 HC PRIVATE W/ TELEMETRY

## 2024-07-04 PROCEDURE — 2580000003 HC RX 258: Performed by: STUDENT IN AN ORGANIZED HEALTH CARE EDUCATION/TRAINING PROGRAM

## 2024-07-04 PROCEDURE — 82962 GLUCOSE BLOOD TEST: CPT

## 2024-07-04 PROCEDURE — 6370000000 HC RX 637 (ALT 250 FOR IP): Performed by: NURSE PRACTITIONER

## 2024-07-04 PROCEDURE — 6360000002 HC RX W HCPCS: Performed by: STUDENT IN AN ORGANIZED HEALTH CARE EDUCATION/TRAINING PROGRAM

## 2024-07-04 PROCEDURE — 6370000000 HC RX 637 (ALT 250 FOR IP): Performed by: STUDENT IN AN ORGANIZED HEALTH CARE EDUCATION/TRAINING PROGRAM

## 2024-07-04 RX ORDER — POLYETHYLENE GLYCOL 3350 17 G/17G
17 POWDER, FOR SOLUTION ORAL 2 TIMES DAILY
Status: DISCONTINUED | OUTPATIENT
Start: 2024-07-04 | End: 2024-07-09 | Stop reason: HOSPADM

## 2024-07-04 RX ORDER — NICOTINE 21 MG/24HR
1 PATCH, TRANSDERMAL 24 HOURS TRANSDERMAL DAILY
Status: DISCONTINUED | OUTPATIENT
Start: 2024-07-04 | End: 2024-07-09 | Stop reason: HOSPADM

## 2024-07-04 RX ORDER — SENNOSIDES A AND B 8.6 MG/1
1 TABLET, FILM COATED ORAL 2 TIMES DAILY
Status: DISCONTINUED | OUTPATIENT
Start: 2024-07-04 | End: 2024-07-05

## 2024-07-04 RX ORDER — NIFEDIPINE 30 MG/1
30 TABLET, EXTENDED RELEASE ORAL DAILY
Status: DISCONTINUED | OUTPATIENT
Start: 2024-07-05 | End: 2024-07-09 | Stop reason: HOSPADM

## 2024-07-04 RX ADMIN — SENNOSIDES 8.6 MG: 8.6 TABLET, FILM COATED ORAL at 21:05

## 2024-07-04 RX ADMIN — INSULIN GLARGINE 30 UNITS: 100 INJECTION, SOLUTION SUBCUTANEOUS at 21:04

## 2024-07-04 RX ADMIN — ATORVASTATIN CALCIUM 20 MG: 20 TABLET, FILM COATED ORAL at 09:13

## 2024-07-04 RX ADMIN — DILTIAZEM HYDROCHLORIDE 30 MG: 30 TABLET ORAL at 14:54

## 2024-07-04 RX ADMIN — ASPIRIN 81 MG: 81 TABLET, COATED ORAL at 09:13

## 2024-07-04 RX ADMIN — CARVEDILOL 25 MG: 12.5 TABLET, FILM COATED ORAL at 09:13

## 2024-07-04 RX ADMIN — INSULIN LISPRO 2 UNITS: 100 INJECTION, SOLUTION INTRAVENOUS; SUBCUTANEOUS at 17:29

## 2024-07-04 RX ADMIN — HYDROCHLOROTHIAZIDE 25 MG: 25 TABLET ORAL at 09:13

## 2024-07-04 RX ADMIN — SODIUM CHLORIDE, PRESERVATIVE FREE 10 ML: 5 INJECTION INTRAVENOUS at 21:06

## 2024-07-04 RX ADMIN — LOSARTAN POTASSIUM 100 MG: 50 TABLET, FILM COATED ORAL at 09:13

## 2024-07-04 RX ADMIN — ENOXAPARIN SODIUM 40 MG: 100 INJECTION SUBCUTANEOUS at 09:13

## 2024-07-04 RX ADMIN — SODIUM CHLORIDE, PRESERVATIVE FREE 10 ML: 5 INJECTION INTRAVENOUS at 09:14

## 2024-07-04 RX ADMIN — POLYETHYLENE GLYCOL 3350 17 G: 17 POWDER, FOR SOLUTION ORAL at 12:46

## 2024-07-04 RX ADMIN — INSULIN GLARGINE 30 UNITS: 100 INJECTION, SOLUTION SUBCUTANEOUS at 09:13

## 2024-07-04 RX ADMIN — INSULIN LISPRO 4 UNITS: 100 INJECTION, SOLUTION INTRAVENOUS; SUBCUTANEOUS at 21:05

## 2024-07-04 RX ADMIN — LABETALOL HYDROCHLORIDE 20 MG: 5 INJECTION, SOLUTION INTRAVENOUS at 08:11

## 2024-07-04 RX ADMIN — SENNOSIDES 8.6 MG: 8.6 TABLET, FILM COATED ORAL at 14:52

## 2024-07-04 ASSESSMENT — PAIN SCALES - GENERAL
PAINLEVEL_OUTOF10: 0
PAINLEVEL_OUTOF10: 0

## 2024-07-04 NOTE — PROGRESS NOTES
Hospitalist Progress Note    NAME:   Ann Turner   : 1950   MRN: [id number]     Date/Time: 2024 4:15 PM  Patient PCP: Jethro Amos MD    Estimated discharge date:   Barriers: BP improvement and stability      Assessment / Plan:      Gait instability  Left greater than right muscle weakness-history CVA  NPH  Mild cervical cord compression  Essential hypertension  Hyperlipidemia  Admit to telemetry monitoring  Neurology consulted, greatly appreciate their expertise  Continue PTA aspirin, atorvastatin, Coreg, losartan hydrochlorothiazide  PT/OT consulted  Case management consulted for SNF placement  TIA/CVA order set utilized out of abundance of caution  -NIHSS 0  - outpatient followup with Neurology regarding NPH, no urgent interventions needed  - discussed with NSGY regarding cervical and thoracic MRI, noted mild cord compression, no edema. No intervention needed at this time, outpatient NSGY followup  - markedly hypertensive, initially started diltiazem, changed to nifedipine. Has prn labetalol    E coli UTI  - sensi's noted, s/p 3 days of CTX    Constipation  - increased bowel reg     Diabetes mellitus  Decrease PTA glargine to 30 units twice daily  Of coverage insulin  Accu-Cheks  Diabetic diet  Hypoglycemia protocol in place     Baseline urinary incontinence  Unchanged-continue outpatient management  Bladder management protocol in place     CKD 3, at baseline  Trend renal function  Renally dose medications and avoid nephrotoxic agents     Medical Decision Making:   I personally reviewed labs: cbc bmp urine culture  I personally reviewed imaging:   Toxic drug monitoring: monitor for bradycardia from carvedilol toxicity  Discussed case with:  RN        Code Status: Full  DVT Prophylaxis: Lovenox  GI Prophylaxis: Not indicated  Baseline: Lives independently, ambulates with walker    Subjective:     Chief Complaint / Reason for Physician Visit  \"Followup gait unsteadiness\".  Discussed

## 2024-07-04 NOTE — PROGRESS NOTES
Bedside shift report giving to MAYDA Castellanos , report included recent findings and labs. Patient had no complaint of pain this shift. Morning BP elevated prn labetalol giving and MD made aware. Found nicotine lozenges 2mg on patients bedside table  (she advised she takes about 27 of them a day. MD made aware and patient was advised not to take any more. Son was also made aware. Patient has not had a bowel movement in the last two days. Miralax giving and patient attempted to go to toilet but no bowle movement on this shift.

## 2024-07-04 NOTE — PROGRESS NOTES
End of Shift Note    Bedside shift change report given to Samantha (oncoming nurse) by Leslye Aguilera RN (offgoing nurse).  Report included the following information SBAR, Kardex, and MAR    Shift worked:  7p-7a     Shift summary and any significant changes:     Humalog was held due to the patients blood glucose level, see MAR.  Patient was given Labetalol once for BP of 172/64, see PRN MAR.  IV has been flushed and is patent.   Family member was present at bedside at change of shift.  Patient teaching and routine rounding has been done.      Concerns for physician to address:       Zone phone for oncoming shift:          Activity:     Number times ambulated in hallways past shift: 0  Number of times OOB to chair past shift: 0    Cardiac:   Cardiac Monitoring: Yes           Access:  Current line(s): PIV     Genitourinary:   Urinary status: voiding and external catheter    Respiratory:      Chronic home O2 use?: NO  Incentive spirometer at bedside: NO       GI:     Current diet:  ADULT DIET; Regular; 4 carb choices (60 gm/meal); Low Fat/Low Chol/High Fiber/WILMAR  Passing flatus: YES  Tolerating current diet: YES       Pain Management:   Patient states pain is manageable on current regimen: YES    Skin:     Interventions: internal/external urinary devices    Patient Safety:  Fall Score:    Interventions: bed/chair alarm, assistive device (walker, cane. etc), gripper socks, and pt to call before getting OOB       Length of Stay:  Expected LOS: 3  Actual LOS: 2      Leslye Aguilera RN

## 2024-07-04 NOTE — PLAN OF CARE
Problem: Chronic Conditions and Co-morbidities  Goal: Patient's chronic conditions and co-morbidity symptoms are monitored and maintained or improved  7/4/2024 1053 by Samantha Sterling, RN  Outcome: Progressing  7/3/2024 2055 by Arnel Friedman, RN  Outcome: Progressing

## 2024-07-05 LAB
EKG ATRIAL RATE: 76 BPM
EKG DIAGNOSIS: NORMAL
EKG P AXIS: 68 DEGREES
EKG P-R INTERVAL: 200 MS
EKG Q-T INTERVAL: 380 MS
EKG QRS DURATION: 76 MS
EKG QTC CALCULATION (BAZETT): 427 MS
EKG R AXIS: 31 DEGREES
EKG T AXIS: 82 DEGREES
EKG VENTRICULAR RATE: 76 BPM
GLUCOSE BLD STRIP.AUTO-MCNC: 210 MG/DL (ref 65–117)
GLUCOSE BLD STRIP.AUTO-MCNC: 261 MG/DL (ref 65–117)
GLUCOSE BLD STRIP.AUTO-MCNC: 275 MG/DL (ref 65–117)
GLUCOSE BLD STRIP.AUTO-MCNC: 303 MG/DL (ref 65–117)
SERVICE CMNT-IMP: ABNORMAL

## 2024-07-05 PROCEDURE — 6370000000 HC RX 637 (ALT 250 FOR IP): Performed by: STUDENT IN AN ORGANIZED HEALTH CARE EDUCATION/TRAINING PROGRAM

## 2024-07-05 PROCEDURE — 6360000002 HC RX W HCPCS: Performed by: STUDENT IN AN ORGANIZED HEALTH CARE EDUCATION/TRAINING PROGRAM

## 2024-07-05 PROCEDURE — 97535 SELF CARE MNGMENT TRAINING: CPT | Performed by: OCCUPATIONAL THERAPIST

## 2024-07-05 PROCEDURE — 97116 GAIT TRAINING THERAPY: CPT

## 2024-07-05 PROCEDURE — 82962 GLUCOSE BLOOD TEST: CPT

## 2024-07-05 PROCEDURE — 2580000003 HC RX 258: Performed by: STUDENT IN AN ORGANIZED HEALTH CARE EDUCATION/TRAINING PROGRAM

## 2024-07-05 PROCEDURE — 6370000000 HC RX 637 (ALT 250 FOR IP): Performed by: NURSE PRACTITIONER

## 2024-07-05 PROCEDURE — 1100000003 HC PRIVATE W/ TELEMETRY

## 2024-07-05 RX ORDER — NIFEDIPINE 30 MG/1
30 TABLET, EXTENDED RELEASE ORAL DAILY
Qty: 30 TABLET | Refills: 3 | Status: SHIPPED | OUTPATIENT
Start: 2024-07-06

## 2024-07-05 RX ORDER — NICOTINE 21 MG/24HR
1 PATCH, TRANSDERMAL 24 HOURS TRANSDERMAL DAILY
Qty: 30 PATCH | Refills: 0 | Status: SHIPPED | OUTPATIENT
Start: 2024-07-06 | End: 2024-07-09

## 2024-07-05 RX ORDER — POLYETHYLENE GLYCOL 3350 17 G/17G
17 POWDER, FOR SOLUTION ORAL 2 TIMES DAILY PRN
Qty: 527 G | Refills: 0 | Status: SHIPPED | OUTPATIENT
Start: 2024-07-05 | End: 2024-08-04

## 2024-07-05 RX ORDER — SENNOSIDES A AND B 8.6 MG/1
2 TABLET, FILM COATED ORAL 2 TIMES DAILY PRN
Qty: 120 TABLET | Refills: 0 | Status: SHIPPED | OUTPATIENT
Start: 2024-07-05 | End: 2024-08-04

## 2024-07-05 RX ORDER — SENNOSIDES A AND B 8.6 MG/1
2 TABLET, FILM COATED ORAL 2 TIMES DAILY
Status: DISCONTINUED | OUTPATIENT
Start: 2024-07-05 | End: 2024-07-09 | Stop reason: HOSPADM

## 2024-07-05 RX ORDER — BISACODYL 10 MG
10 SUPPOSITORY, RECTAL RECTAL ONCE
Status: COMPLETED | OUTPATIENT
Start: 2024-07-05 | End: 2024-07-05

## 2024-07-05 RX ADMIN — INSULIN GLARGINE 30 UNITS: 100 INJECTION, SOLUTION SUBCUTANEOUS at 09:41

## 2024-07-05 RX ADMIN — POLYETHYLENE GLYCOL 3350 17 G: 17 POWDER, FOR SOLUTION ORAL at 09:39

## 2024-07-05 RX ADMIN — INSULIN LISPRO 6 UNITS: 100 INJECTION, SOLUTION INTRAVENOUS; SUBCUTANEOUS at 11:56

## 2024-07-05 RX ADMIN — SENNOSIDES 17.2 MG: 8.6 TABLET, FILM COATED ORAL at 20:46

## 2024-07-05 RX ADMIN — INSULIN LISPRO 4 UNITS: 100 INJECTION, SOLUTION INTRAVENOUS; SUBCUTANEOUS at 17:02

## 2024-07-05 RX ADMIN — POLYETHYLENE GLYCOL 3350 17 G: 17 POWDER, FOR SOLUTION ORAL at 20:46

## 2024-07-05 RX ADMIN — HYDROCHLOROTHIAZIDE 25 MG: 25 TABLET ORAL at 09:41

## 2024-07-05 RX ADMIN — ASPIRIN 81 MG: 81 TABLET, COATED ORAL at 09:42

## 2024-07-05 RX ADMIN — ATORVASTATIN CALCIUM 20 MG: 20 TABLET, FILM COATED ORAL at 09:42

## 2024-07-05 RX ADMIN — INSULIN LISPRO 2 UNITS: 100 INJECTION, SOLUTION INTRAVENOUS; SUBCUTANEOUS at 09:41

## 2024-07-05 RX ADMIN — LOSARTAN POTASSIUM 100 MG: 50 TABLET, FILM COATED ORAL at 09:41

## 2024-07-05 RX ADMIN — ENOXAPARIN SODIUM 40 MG: 100 INJECTION SUBCUTANEOUS at 09:43

## 2024-07-05 RX ADMIN — CARVEDILOL 25 MG: 12.5 TABLET, FILM COATED ORAL at 09:42

## 2024-07-05 RX ADMIN — SODIUM CHLORIDE, PRESERVATIVE FREE 10 ML: 5 INJECTION INTRAVENOUS at 09:43

## 2024-07-05 RX ADMIN — INSULIN GLARGINE 30 UNITS: 100 INJECTION, SOLUTION SUBCUTANEOUS at 21:14

## 2024-07-05 RX ADMIN — NIFEDIPINE 30 MG: 30 TABLET, EXTENDED RELEASE ORAL at 09:41

## 2024-07-05 RX ADMIN — SODIUM CHLORIDE, PRESERVATIVE FREE 10 ML: 5 INJECTION INTRAVENOUS at 20:46

## 2024-07-05 RX ADMIN — SENNOSIDES 8.6 MG: 8.6 TABLET, FILM COATED ORAL at 09:41

## 2024-07-05 RX ADMIN — BISACODYL 10 MG: 10 SUPPOSITORY RECTAL at 11:56

## 2024-07-05 ASSESSMENT — PAIN SCALES - GENERAL: PAINLEVEL_OUTOF10: 0

## 2024-07-05 NOTE — CARE COORDINATION
Transition of Care Plan:     RUR: 12%  Prior Level of Functioning: independent   Disposition: SNF placement- choice pending   If SNF or IPR: Date FOC offered: 7/2  Date FOC received: 7/2 7/5  Accepting facility: [facility]  Date authorization started with reference number: 7/5, reference#[id number]  Date authorization received and expires: pending review  Follow up appointments: after rehab   DME needed: available at facility   Transportation at discharge:  H2H (family to pay)  IM/IMM Medicare/ letter given: given   Is patient a [place] and connected with VA? no              If yes, was  transfer form completed and VA notified?   Caregiver Contact: son  Discharge Caregiver contacted prior to discharge? yes  Care Conference needed? no  Barriers to discharge:  insurance auth    Chart reviewed. CM aware of discharge order. Obtain FOC from family- family has chosen [facility] for SNF placement. CM initiated insurance authorization at 11:20AM- reference#[id number]. As of 3:03 PM, request has not yet been reviewed and authorization remains pending at this time. 2nd IM given and reviewed with pt. Updated pt's son (Claus) of the above. Family prefers to utilize H2H transport upon d/c and will pay for wheelchair van. If authorization is approved over the weekend, covering CM to notify family of cost and they will call to make payment. Will continue to follow.    SAVANNA Mckeon   Care Manager, [facility]  [phone]

## 2024-07-05 NOTE — DISCHARGE SUMMARY
Discharge Summary    Name: Ann Turner  [id number]  YOB: 1950 (Age: 73 y.o.)   Date of Admission: 7/1/2024  Date of Discharge: 7/5/2024  Attending Physician: Mendel, David L, MD    Discharge Diagnosis:     Gait instability  Left greater than right muscle weakness-history CVA  NPH  Mild cervical cord compression  Essential hypertension  Hyperlipidemia  E coli UTI   Diabetes mellitus  CKD 3, at baseline   Baseline urinary incontinence   Constipation     Consultations:  IP CONSULT TO NEUROLOGY  IP CONSULT TO CASE MANAGEMENT      Brief Admission History/Reason for Admission Per Jonathan Taylor, DO:     Ann Turner is a 73 y.o.  female with PMHx as listed below presenting to the emergency department accompanied by son with concerns for progressively worsening bilateral lower extremity weakness and gait instability with at least 4 weakness related falls in the past month.  Patient reports most recent fall occurred 3 days prior falling onto her buttock with consequent \"tailbone pain\" since that time has been very reluctant to move around and is no longer able to ambulate safely or rise from seated position.  At baseline ambulates with walker.  Has history of CVA with residual left-sided weakness which is cause for need for walker.  Reports that her legs gave out from under her and she experience bilateral weakness stating that the left leg was more weak than the right.  Denies other associated symptoms.  Did not strike her head or lose consciousness.  Does experience intermittent episodes of lightheadedness with positional change.  Reports good p.o. intake.  Denies increasing confusion or altered mental status-confirmed with son at bedside.  Has chronic urinary incontinence which is unchanged.  ROS otherwise negative.  Denies tobacco, alcohol, illicit drugs.  No recent medication changes.     In the ED, patient afebrile and hemodynamically stable (hypertensive  Exam:  Patient seen and examined by me on discharge day.  Pertinent Findings:  Patient Vitals for the past 24 hrs:   BP Temp Temp src Pulse Resp SpO2 Weight   07/05/24 0745 (!) 144/71 98.1 °F (36.7 °C) Oral 76 16 94 % --   07/05/24 0600 -- -- -- -- -- -- 64.4 kg (142 lb)   07/04/24 2103 125/77 98.4 °F (36.9 °C) Oral 78 17 92 % --       General:          Alert, cooperative  EENT:              EOMI. Anicteric sclerae.  Resp:               CTA bilaterally, no wheezing or rales.  No accessory muscle use  CV:                  Regular  rate,  No edema  GI:                   Soft, Non distended, Non tender.  +Bowel sounds  Neurologic:       Alert and oriented X 3, normal speech,   Skin:                No rashes.  No jaundice    Discharge/Recent Laboratory Results:  Recent Labs     07/03/24  0335      K 3.5      CO2 26   BUN 29*   CREATININE 0.99   GLUCOSE 126*   CALCIUM 9.4   MG 2.0     Recent Labs     07/03/24  0335   HGB 12.3   HCT 37.9   WBC 7.7          Discharge Medications:     Medication List        START taking these medications      nicotine 14 MG/24HR  Commonly known as: NICODERM CQ  Place 1 patch onto the skin daily  Start taking on: July 6, 2024     NIFEdipine 30 MG extended release tablet  Commonly known as: PROCARDIA XL  Take 1 tablet by mouth daily  Start taking on: July 6, 2024     polyethylene glycol 17 g packet  Commonly known as: GLYCOLAX  Take 1 packet by mouth 2 times daily as needed for Constipation     senna 8.6 MG tablet  Commonly known as: SENOKOT  Take 2 tablets by mouth 2 times daily as needed for Constipation            CONTINUE taking these medications      aspirin 81 MG EC tablet     atorvastatin 10 MG tablet  Commonly known as: LIPITOR     carvedilol 25 MG tablet  Commonly known as: COREG     insulin glargine 100 UNIT/ML injection vial  Commonly known as: LANTUS     losartan-hydroCHLOROthiazide 100-25 MG per tablet  Commonly known as: HYZAAR     metFORMIN 1000 MG

## 2024-07-05 NOTE — PLAN OF CARE
Problem: Discharge Planning  Goal: Discharge to home or other facility with appropriate resources  7/5/2024 1234 by Oneida Londono, RN  Outcome: Progressing  7/5/2024 0144 by Jasmin Holman RN  Outcome: Progressing     Problem: Pain  Goal: Verbalizes/displays adequate comfort level or baseline comfort level  7/5/2024 1234 by Oneida Londono, RN  Outcome: Progressing  7/5/2024 0144 by Jasmin Holman RN  Outcome: Progressing

## 2024-07-05 NOTE — DISCHARGE INSTRUCTIONS
You were treated for gait instability and found to have normal pressure hydrocephalus as well as a urinary tract infection. You were seen by Neurology and Neurosurgery, please followup with both of their clinics. Please take the medications as listed in this paperwork and followup with your primary care doctor as well.

## 2024-07-05 NOTE — PLAN OF CARE
Intact  Standing: Impaired  Standing - Static: Fair;Good;Constant support  Standing - Dynamic: Fair;Constant support      ADL Intervention:      LE Dressing: Maximum assistance  LE Dressing Skilled Clinical Factors: doffing wet depends and donning new depends, seated to thread pt was able to thread with min assist, max assist to pull up and down due to LE weaknes and posterior loess of balance.  Pt needs support of backs of legs on chair surface for stability and still needed max assist    Toileting: Maximum assistance    Pain Rating:  Reports buttock pain from fall prior to admit, issued waffle cushion    Activity Tolerance:   Fair  and requires rest breaks  Please refer to the flowsheet for vital signs taken during this treatment.    After treatment:   Patient left in no apparent distress sitting up in chair, Call bell within reach, Bed/ chair alarm activated, Updated patient's board on functional status and mobility recommendations, and reclined in recliner and issued waffle cushion    COMMUNICATION/EDUCATION:   The patient's plan of care was discussed with: physical therapist, registered nurse, and patient    Patient Education  Education Given To: Patient  Education Provided: Plan of Care;Role of Therapy;ADL Adaptive Strategies;Mobility Training;Fall Prevention Strategies  Education Method: Verbal  Barriers to Learning: None  Education Outcome: Verbalized understanding;Continued education needed    Thank you for this referral.  Angelica Baker OTR/L  Minutes: 17

## 2024-07-05 NOTE — PLAN OF CARE
Problem: Physical Therapy - Adult  Goal: By Discharge: Performs mobility at highest level of function for planned discharge setting.  See evaluation for individualized goals.  Description: FUNCTIONAL STATUS PRIOR TO ADMISSION: Patient was independent and active without use of DME.  Reports decline in her mobility for the past few weeks with 4-5 falls at home.    HOME SUPPORT PRIOR TO ADMISSION: The patient lived alone and has a son that lives in the area who can provide some intermittent assistance.    Physical Therapy Goals  Initiated 7/2/2024  1.  Patient will move from supine to sit and sit to supine in bed with supervision/set-up within 7 day(s).    2.  Patient will perform sit to stand with contact guard assist within 7 day(s).  3.  Patient will transfer from bed to chair and chair to bed with contact guard assist using the least restrictive device within 7 day(s).  4.  Patient will ambulate with contact guard assist for 150 feet with the least restrictive device within 7 day(s).     Outcome: Progressing     PHYSICAL THERAPY TREATMENT    Patient: Ann Turner (73 y.o. female)  Date: 7/5/2024  Diagnosis: Unsteady gait [R26.81]  Gait instability [R26.81]  Bilateral leg weakness [R29.898]  Acute cystitis without hematuria [N30.00]  Recurrent falls [R29.6] Unsteady gait      Precautions: Fall Risk                      ASSESSMENT:  Patient continues to benefit from skilled PT services and is slowly progressing towards goals. Received patient sitting up in chair, cleared by nursing to mobilize and patient agreeable. Performed sit<>stand transfers with contact guard assist, cuing for proper hand placement and additional time to rise. On first stand patient was leaning against chair for added stability, however second time she was able to step away from chair and maintain static standing balance with BUE support on walker and contact guard assist. Able to ambulate 35' around the room today using RW and minimum

## 2024-07-05 NOTE — PROGRESS NOTES
Bedside and Verbal shift change report given to MAYDA Mohamud (oncoming nurse) by MAYDA Castellanos (offgoing nurse). Report included the following information Nurse Handoff Report, Adult Overview, Intake/Output, and MAR. Linda care done and pur wick was changed. Patient had an uneventful night. .

## 2024-07-06 LAB
ANION GAP SERPL CALC-SCNC: 5 MMOL/L (ref 5–15)
BASOPHILS # BLD: 0.1 K/UL (ref 0–0.1)
BASOPHILS NFR BLD: 1 % (ref 0–1)
BUN SERPL-MCNC: 33 MG/DL (ref 6–20)
BUN/CREAT SERPL: 30 (ref 12–20)
CALCIUM SERPL-MCNC: 9.6 MG/DL (ref 8.5–10.1)
CHLORIDE SERPL-SCNC: 100 MMOL/L (ref 97–108)
CO2 SERPL-SCNC: 30 MMOL/L (ref 21–32)
CREAT SERPL-MCNC: 1.1 MG/DL (ref 0.55–1.02)
DIFFERENTIAL METHOD BLD: ABNORMAL
EOSINOPHIL # BLD: 0.3 K/UL (ref 0–0.4)
EOSINOPHIL NFR BLD: 4 % (ref 0–7)
ERYTHROCYTE [DISTWIDTH] IN BLOOD BY AUTOMATED COUNT: 13.2 % (ref 11.5–14.5)
GLUCOSE BLD STRIP.AUTO-MCNC: 216 MG/DL (ref 65–117)
GLUCOSE BLD STRIP.AUTO-MCNC: 250 MG/DL (ref 65–117)
GLUCOSE BLD STRIP.AUTO-MCNC: 278 MG/DL (ref 65–117)
GLUCOSE BLD STRIP.AUTO-MCNC: 290 MG/DL (ref 65–117)
GLUCOSE SERPL-MCNC: 260 MG/DL (ref 65–100)
HCT VFR BLD AUTO: 35.7 % (ref 35–47)
HGB BLD-MCNC: 11.8 G/DL (ref 11.5–16)
IMM GRANULOCYTES # BLD AUTO: 0.1 K/UL (ref 0–0.04)
IMM GRANULOCYTES NFR BLD AUTO: 1 % (ref 0–0.5)
LYMPHOCYTES # BLD: 2.6 K/UL (ref 0.8–3.5)
LYMPHOCYTES NFR BLD: 30 % (ref 12–49)
MAGNESIUM SERPL-MCNC: 2.2 MG/DL (ref 1.6–2.4)
MCH RBC QN AUTO: 27.8 PG (ref 26–34)
MCHC RBC AUTO-ENTMCNC: 33.1 G/DL (ref 30–36.5)
MCV RBC AUTO: 84 FL (ref 80–99)
MONOCYTES # BLD: 0.8 K/UL (ref 0–1)
MONOCYTES NFR BLD: 9 % (ref 5–13)
NEUTS SEG # BLD: 4.7 K/UL (ref 1.8–8)
NEUTS SEG NFR BLD: 55 % (ref 32–75)
NRBC # BLD: 0 K/UL (ref 0–0.01)
NRBC BLD-RTO: 0 PER 100 WBC
PHOSPHATE SERPL-MCNC: 3.6 MG/DL (ref 2.6–4.7)
PLATELET # BLD AUTO: 205 K/UL (ref 150–400)
POTASSIUM SERPL-SCNC: 3.8 MMOL/L (ref 3.5–5.1)
RBC # BLD AUTO: 4.25 M/UL (ref 3.8–5.2)
RBC MORPH BLD: ABNORMAL
SERVICE CMNT-IMP: ABNORMAL
SODIUM SERPL-SCNC: 135 MMOL/L (ref 136–145)
WBC # BLD AUTO: 8.6 K/UL (ref 3.6–11)

## 2024-07-06 PROCEDURE — 6360000002 HC RX W HCPCS: Performed by: STUDENT IN AN ORGANIZED HEALTH CARE EDUCATION/TRAINING PROGRAM

## 2024-07-06 PROCEDURE — 6370000000 HC RX 637 (ALT 250 FOR IP): Performed by: NURSE PRACTITIONER

## 2024-07-06 PROCEDURE — 36415 COLL VENOUS BLD VENIPUNCTURE: CPT

## 2024-07-06 PROCEDURE — 6370000000 HC RX 637 (ALT 250 FOR IP): Performed by: STUDENT IN AN ORGANIZED HEALTH CARE EDUCATION/TRAINING PROGRAM

## 2024-07-06 PROCEDURE — 2580000003 HC RX 258: Performed by: STUDENT IN AN ORGANIZED HEALTH CARE EDUCATION/TRAINING PROGRAM

## 2024-07-06 PROCEDURE — 83735 ASSAY OF MAGNESIUM: CPT

## 2024-07-06 PROCEDURE — 82962 GLUCOSE BLOOD TEST: CPT

## 2024-07-06 PROCEDURE — 85025 COMPLETE CBC W/AUTO DIFF WBC: CPT

## 2024-07-06 PROCEDURE — 80048 BASIC METABOLIC PNL TOTAL CA: CPT

## 2024-07-06 PROCEDURE — 1100000003 HC PRIVATE W/ TELEMETRY

## 2024-07-06 PROCEDURE — 84100 ASSAY OF PHOSPHORUS: CPT

## 2024-07-06 RX ORDER — INSULIN GLARGINE 100 [IU]/ML
32 INJECTION, SOLUTION SUBCUTANEOUS 2 TIMES DAILY
Status: DISCONTINUED | OUTPATIENT
Start: 2024-07-06 | End: 2024-07-08

## 2024-07-06 RX ORDER — INSULIN GLARGINE 100 [IU]/ML
33 INJECTION, SOLUTION SUBCUTANEOUS 2 TIMES DAILY
Status: DISCONTINUED | OUTPATIENT
Start: 2024-07-06 | End: 2024-07-06

## 2024-07-06 RX ADMIN — HYDROCHLOROTHIAZIDE 25 MG: 25 TABLET ORAL at 09:53

## 2024-07-06 RX ADMIN — INSULIN GLARGINE 32 UNITS: 100 INJECTION, SOLUTION SUBCUTANEOUS at 20:30

## 2024-07-06 RX ADMIN — SENNOSIDES 17.2 MG: 8.6 TABLET, FILM COATED ORAL at 09:53

## 2024-07-06 RX ADMIN — INSULIN LISPRO 4 UNITS: 100 INJECTION, SOLUTION INTRAVENOUS; SUBCUTANEOUS at 18:06

## 2024-07-06 RX ADMIN — INSULIN LISPRO 4 UNITS: 100 INJECTION, SOLUTION INTRAVENOUS; SUBCUTANEOUS at 14:02

## 2024-07-06 RX ADMIN — SODIUM CHLORIDE, PRESERVATIVE FREE 10 ML: 5 INJECTION INTRAVENOUS at 20:25

## 2024-07-06 RX ADMIN — LOSARTAN POTASSIUM 100 MG: 50 TABLET, FILM COATED ORAL at 09:53

## 2024-07-06 RX ADMIN — ATORVASTATIN CALCIUM 20 MG: 20 TABLET, FILM COATED ORAL at 09:53

## 2024-07-06 RX ADMIN — NIFEDIPINE 30 MG: 30 TABLET, EXTENDED RELEASE ORAL at 09:53

## 2024-07-06 RX ADMIN — SODIUM CHLORIDE, PRESERVATIVE FREE 10 ML: 5 INJECTION INTRAVENOUS at 09:55

## 2024-07-06 RX ADMIN — ASPIRIN 81 MG: 81 TABLET, COATED ORAL at 09:53

## 2024-07-06 RX ADMIN — INSULIN LISPRO 2 UNITS: 100 INJECTION, SOLUTION INTRAVENOUS; SUBCUTANEOUS at 09:54

## 2024-07-06 RX ADMIN — CARVEDILOL 25 MG: 12.5 TABLET, FILM COATED ORAL at 09:53

## 2024-07-06 RX ADMIN — INSULIN GLARGINE 30 UNITS: 100 INJECTION, SOLUTION SUBCUTANEOUS at 09:55

## 2024-07-06 RX ADMIN — ENOXAPARIN SODIUM 40 MG: 100 INJECTION SUBCUTANEOUS at 09:54

## 2024-07-06 RX ADMIN — SENNOSIDES 17.2 MG: 8.6 TABLET, FILM COATED ORAL at 20:24

## 2024-07-06 RX ADMIN — POLYETHYLENE GLYCOL 3350 17 G: 17 POWDER, FOR SOLUTION ORAL at 20:25

## 2024-07-06 RX ADMIN — POLYETHYLENE GLYCOL 3350 17 G: 17 POWDER, FOR SOLUTION ORAL at 09:52

## 2024-07-06 ASSESSMENT — PAIN SCALES - GENERAL
PAINLEVEL_OUTOF10: 0

## 2024-07-06 NOTE — DISCHARGE SUMMARY
Discharge Summary    Name: Ann Turner  [id number]  YOB: 1950 (Age: 73 y.o.)   Date of Admission: 7/1/2024  Date of Discharge: 7/6/2024  Attending Physician: Mendel, David L, MD    Discharge Diagnosis:     Gait instability  Left greater than right muscle weakness-history CVA  NPH  Mild cervical cord compression  Essential hypertension  Hyperlipidemia  E coli UTI   Diabetes mellitus  CKD 3, at baseline   Baseline urinary incontinence   Constipation     Consultations:  IP CONSULT TO NEUROLOGY  IP CONSULT TO CASE MANAGEMENT      Brief Admission History/Reason for Admission Per Jonathan Taylor, DO:     Ann Turner is a 73 y.o.  female with PMHx as listed below presenting to the emergency department accompanied by son with concerns for progressively worsening bilateral lower extremity weakness and gait instability with at least 4 weakness related falls in the past month.  Patient reports most recent fall occurred 3 days prior falling onto her buttock with consequent \"tailbone pain\" since that time has been very reluctant to move around and is no longer able to ambulate safely or rise from seated position.  At baseline ambulates with walker.  Has history of CVA with residual left-sided weakness which is cause for need for walker.  Reports that her legs gave out from under her and she experience bilateral weakness stating that the left leg was more weak than the right.  Denies other associated symptoms.  Did not strike her head or lose consciousness.  Does experience intermittent episodes of lightheadedness with positional change.  Reports good p.o. intake.  Denies increasing confusion or altered mental status-confirmed with son at bedside.  Has chronic urinary incontinence which is unchanged.  ROS otherwise negative.  Denies tobacco, alcohol, illicit drugs.  No recent medication changes.     In the ED, patient afebrile and hemodynamically stable (hypertensive  140s/60s), saturating mid 90s on room air.  ECG demonstrates normal sinus rhythm without definitive ischemic change.  CT head demonstrates lateral and third ventricle prominence slightly out of proportion to sulcal likely reflecting atrophy noting that NPH should be considered as well as nonspecific white matter changes likely reflective of microvascular ischemic change.  CXR negative for acute process.  Labs demonstrate: CBC unremarkable, sodium 137, potassium 3.9, glucose 122, BUN 35, creatinine 1.06, LFTs grossly unremarkable, high sensitive troponin 9, UA not reflexed to culture.  Patient given 1 g IV ceftriaxone by ED provider.    Brief Hospital Course by Main Problems:     Gait instability  Left greater than right muscle weakness-history CVA  NPH  Mild cervical cord compression  Essential hypertension  Hyperlipidemia  Admit to telemetry monitoring  Neurology consulted, greatly appreciate their expertise  Continue PTA aspirin, atorvastatin, Coreg, losartan hydrochlorothiazide  PT/OT consulted  Case management consulted for SNF placement  TIA/CVA order set utilized out of abundance of caution  -NIHSS 0  - outpatient followup with Neurology regarding NPH, no urgent interventions needed  - discussed with NSGY regarding cervical and thoracic MRI, noted mild cord compression, no edema. No intervention needed at this time, outpatient NSGY followup  - markedly hypertensive, initially started diltiazem, changed to nifedipine. Has prn labetalol. BP improved     E coli UTI  - sensi's noted, s/p 3 days of CTX     Constipation  - increased bowel reg     Diabetes mellitus  Decrease PTA glargine to 30 units twice daily  Of coverage insulin  Accu-Cheks  Diabetic diet  Hypoglycemia protocol in place     Baseline urinary incontinence  Unchanged-continue outpatient management  Bladder management protocol in place     CKD 3, at baseline  Trend renal function  Renally dose medications and avoid nephrotoxic agents    Discharge

## 2024-07-06 NOTE — CARE COORDINATION
Transition of Care Plan:     RUR: 12%  Prior Level of Functioning: independent   Disposition: SNF placement- choice pending   If SNF or IPR: Date FOC offered: 7/2  Date FOC received: 7/2 7/5  Accepting facility: [facility]  Date authorization started with reference number: 7/5, reference#[id number]  Date authorization received and expires: pending review  Follow up appointments: after rehab   DME needed: available at facility   Transportation at discharge:  H2H (family to pay)  IM/IMM Medicare/ letter given: given   Is patient a [place] and connected with VA? no              If yes, was  transfer form completed and VA notified?   Caregiver Contact: son  Discharge Caregiver contacted prior to discharge? yes  Care Conference needed? no  Barriers to discharge:  insurance auth    Per Bill Chaudhry is still pending & they verified they have everything they need to make a decision.      Teresa Kate  Ext [phone]

## 2024-07-06 NOTE — PLAN OF CARE
Problem: Discharge Planning  Goal: Discharge to home or other facility with appropriate resources  7/5/2024 2246 by Leonard Franklin RN  Outcome: Progressing  7/5/2024 1234 by Oneida Londono RN  Outcome: Progressing     Problem: Pain  Goal: Verbalizes/displays adequate comfort level or baseline comfort level  7/5/2024 2246 by Leonard Franklin RN  Outcome: Progressing  7/5/2024 1234 by Oneida Londono RN  Outcome: Progressing     Problem: Safety - Adult  Goal: Free from fall injury  Outcome: Progressing     Problem: Skin/Tissue Integrity  Goal: Absence of new skin breakdown  Description: 1.  Monitor for areas of redness and/or skin breakdown  2.  Assess vascular access sites hourly  3.  Every 4-6 hours minimum:  Change oxygen saturation probe site  4.  Every 4-6 hours:  If on nasal continuous positive airway pressure, respiratory therapy assess nares and determine need for appliance change or resting period.  Outcome: Progressing     Problem: Physical Therapy - Adult  Goal: By Discharge: Performs mobility at highest level of function for planned discharge setting.  See evaluation for individualized goals.  Description: FUNCTIONAL STATUS PRIOR TO ADMISSION: Patient was independent and active without use of DME.  Reports decline in her mobility for the past few weeks with 4-5 falls at home.    HOME SUPPORT PRIOR TO ADMISSION: The patient lived alone and has a son that lives in the area who can provide some intermittent assistance.    Physical Therapy Goals  Initiated 7/2/2024  1.  Patient will move from supine to sit and sit to supine in bed with supervision/set-up within 7 day(s).    2.  Patient will perform sit to stand with contact guard assist within 7 day(s).  3.  Patient will transfer from bed to chair and chair to bed with contact guard assist using the least restrictive device within 7 day(s).  4.  Patient will ambulate with contact guard assist for 150 feet with the least restrictive device within 7 day(s).      7/5/2024 1222 by Silva Julio, PT  Outcome: Progressing     Problem: Occupational Therapy - Adult  Goal: By Discharge: Performs self-care activities at highest level of function for planned discharge setting.  See evaluation for individualized goals.  Description: FUNCTIONAL STATUS PRIOR TO ADMISSION: The patient lived alone and reports being mod I with ADLs and functional mobility using RW. She reports a decline in function in the last few weeks with 4+ falls.  HOME SUPPORT: Patient lived alone, has local son who can provide intermittent assistance.    Occupational Therapy Goals:  Initiated 7/2/2024  1.  Patient will perform grooming with setup in supported sit within 7 day(s).  2.  Patient will sit EOB x5 minutes with CGA within 7 day(s).  3.  Patient will perform upper body dressing with Set-up within 7 day(s).  4.  Patient will perform toilet transfers with Minimal Assist  within 7 day(s).  5.  Patient will perform all aspects of toileting with Minimal Assist within 7 day(s).  6.  Patient will participate in upper extremity therapeutic exercise/activities with Supervision for 5 minutes within 7 day(s).    7.  Patient will utilize energy conservation techniques during functional activities with verbal cues within 7 day(s).   7/5/2024 1334 by Angelica Baker, OTR/L  Outcome: Progressing     Problem: Chronic Conditions and Co-morbidities  Goal: Patient's chronic conditions and co-morbidity symptoms are monitored and maintained or improved  Outcome: Progressing

## 2024-07-06 NOTE — PROGRESS NOTES
End of Shift Note    Bedside shift change report given to Samantha OHARA (oncoming nurse) by Leonard Franklin RN (offgoing nurse).  Report included the following information SBAR, Kardex, Intake/Output, MAR, and Recent Results    Shift worked:  nights     Shift summary and any significant changes:     none     Concerns for physician to address:  none     Zone phone for oncoming shift:   [phone]       Activity:     Number times ambulated in hallways past shift: 0  Number of times OOB to chair past shift: 0    Cardiac:   Cardiac Monitoring: No           Access:  Current line(s): PIV     Genitourinary:   Urinary status: voiding and external catheter    Respiratory:      Chronic home O2 use?: NO  Incentive spirometer at bedside: YES       GI:     Current diet:  ADULT DIET; Regular; 4 carb choices (60 gm/meal); Low Fat/Low Chol/High Fiber/WILMAR  Passing flatus: YES  Tolerating current diet: YES       Pain Management:   Patient states pain is manageable on current regimen: YES    Skin:     Interventions: float heels and increase time out of bed    Patient Safety:  Fall Score:    Interventions: bed/chair alarm       Length of Stay:  Expected LOS: 4  Actual LOS: 4      Leonard Franklin RN

## 2024-07-06 NOTE — PLAN OF CARE
Problem: Safety - Adult  Goal: Free from fall injury  7/6/2024 1137 by Samantha Sterling, RN  Outcome: Progressing  7/5/2024 2246 by Leonard Franklin, RN  Outcome: Progressing

## 2024-07-07 LAB
ANION GAP SERPL CALC-SCNC: 6 MMOL/L (ref 5–15)
BASOPHILS # BLD: 0.1 K/UL (ref 0–0.1)
BASOPHILS NFR BLD: 1 % (ref 0–1)
BUN SERPL-MCNC: 34 MG/DL (ref 6–20)
BUN/CREAT SERPL: 29 (ref 12–20)
CALCIUM SERPL-MCNC: 9.2 MG/DL (ref 8.5–10.1)
CHLORIDE SERPL-SCNC: 102 MMOL/L (ref 97–108)
CO2 SERPL-SCNC: 27 MMOL/L (ref 21–32)
CREAT SERPL-MCNC: 1.19 MG/DL (ref 0.55–1.02)
DIFFERENTIAL METHOD BLD: ABNORMAL
EOSINOPHIL # BLD: 0.3 K/UL (ref 0–0.4)
EOSINOPHIL NFR BLD: 4 % (ref 0–7)
ERYTHROCYTE [DISTWIDTH] IN BLOOD BY AUTOMATED COUNT: 13.2 % (ref 11.5–14.5)
GLUCOSE BLD STRIP.AUTO-MCNC: 195 MG/DL (ref 65–117)
GLUCOSE BLD STRIP.AUTO-MCNC: 214 MG/DL (ref 65–117)
GLUCOSE BLD STRIP.AUTO-MCNC: 308 MG/DL (ref 65–117)
GLUCOSE BLD STRIP.AUTO-MCNC: 325 MG/DL (ref 65–117)
GLUCOSE SERPL-MCNC: 307 MG/DL (ref 65–100)
HCT VFR BLD AUTO: 36.3 % (ref 35–47)
HGB BLD-MCNC: 11.7 G/DL (ref 11.5–16)
IMM GRANULOCYTES # BLD AUTO: 0.1 K/UL (ref 0–0.04)
IMM GRANULOCYTES NFR BLD AUTO: 1 % (ref 0–0.5)
LYMPHOCYTES # BLD: 2.5 K/UL (ref 0.8–3.5)
LYMPHOCYTES NFR BLD: 30 % (ref 12–49)
MAGNESIUM SERPL-MCNC: 2.1 MG/DL (ref 1.6–2.4)
MCH RBC QN AUTO: 27.3 PG (ref 26–34)
MCHC RBC AUTO-ENTMCNC: 32.2 G/DL (ref 30–36.5)
MCV RBC AUTO: 84.6 FL (ref 80–99)
MONOCYTES # BLD: 0.6 K/UL (ref 0–1)
MONOCYTES NFR BLD: 8 % (ref 5–13)
NEUTS SEG # BLD: 4.7 K/UL (ref 1.8–8)
NEUTS SEG NFR BLD: 56 % (ref 32–75)
NRBC # BLD: 0 K/UL (ref 0–0.01)
NRBC BLD-RTO: 0 PER 100 WBC
PHOSPHATE SERPL-MCNC: 3.9 MG/DL (ref 2.6–4.7)
PLATELET # BLD AUTO: 188 K/UL (ref 150–400)
PMV BLD AUTO: 11 FL (ref 8.9–12.9)
POTASSIUM SERPL-SCNC: 3.9 MMOL/L (ref 3.5–5.1)
RBC # BLD AUTO: 4.29 M/UL (ref 3.8–5.2)
SERVICE CMNT-IMP: ABNORMAL
SODIUM SERPL-SCNC: 135 MMOL/L (ref 136–145)
WBC # BLD AUTO: 8.3 K/UL (ref 3.6–11)

## 2024-07-07 PROCEDURE — 84100 ASSAY OF PHOSPHORUS: CPT

## 2024-07-07 PROCEDURE — 6370000000 HC RX 637 (ALT 250 FOR IP): Performed by: STUDENT IN AN ORGANIZED HEALTH CARE EDUCATION/TRAINING PROGRAM

## 2024-07-07 PROCEDURE — 82962 GLUCOSE BLOOD TEST: CPT

## 2024-07-07 PROCEDURE — 85025 COMPLETE CBC W/AUTO DIFF WBC: CPT

## 2024-07-07 PROCEDURE — 83735 ASSAY OF MAGNESIUM: CPT

## 2024-07-07 PROCEDURE — 6370000000 HC RX 637 (ALT 250 FOR IP): Performed by: NURSE PRACTITIONER

## 2024-07-07 PROCEDURE — 2580000003 HC RX 258: Performed by: STUDENT IN AN ORGANIZED HEALTH CARE EDUCATION/TRAINING PROGRAM

## 2024-07-07 PROCEDURE — 6360000002 HC RX W HCPCS: Performed by: STUDENT IN AN ORGANIZED HEALTH CARE EDUCATION/TRAINING PROGRAM

## 2024-07-07 PROCEDURE — 36415 COLL VENOUS BLD VENIPUNCTURE: CPT

## 2024-07-07 PROCEDURE — 1100000003 HC PRIVATE W/ TELEMETRY

## 2024-07-07 PROCEDURE — 80048 BASIC METABOLIC PNL TOTAL CA: CPT

## 2024-07-07 RX ORDER — BISACODYL 10 MG
10 SUPPOSITORY, RECTAL RECTAL DAILY PRN
Status: DISCONTINUED | OUTPATIENT
Start: 2024-07-07 | End: 2024-07-09 | Stop reason: HOSPADM

## 2024-07-07 RX ADMIN — HYDROCHLOROTHIAZIDE 25 MG: 25 TABLET ORAL at 09:05

## 2024-07-07 RX ADMIN — INSULIN GLARGINE 32 UNITS: 100 INJECTION, SOLUTION SUBCUTANEOUS at 21:08

## 2024-07-07 RX ADMIN — INSULIN LISPRO 2 UNITS: 100 INJECTION, SOLUTION INTRAVENOUS; SUBCUTANEOUS at 09:06

## 2024-07-07 RX ADMIN — SENNOSIDES 17.2 MG: 8.6 TABLET, FILM COATED ORAL at 09:05

## 2024-07-07 RX ADMIN — SODIUM CHLORIDE, PRESERVATIVE FREE 10 ML: 5 INJECTION INTRAVENOUS at 09:06

## 2024-07-07 RX ADMIN — POLYETHYLENE GLYCOL 3350 17 G: 17 POWDER, FOR SOLUTION ORAL at 09:05

## 2024-07-07 RX ADMIN — CARVEDILOL 25 MG: 12.5 TABLET, FILM COATED ORAL at 09:05

## 2024-07-07 RX ADMIN — SENNOSIDES 17.2 MG: 8.6 TABLET, FILM COATED ORAL at 21:08

## 2024-07-07 RX ADMIN — ASPIRIN 81 MG: 81 TABLET, COATED ORAL at 09:05

## 2024-07-07 RX ADMIN — LOSARTAN POTASSIUM 100 MG: 50 TABLET, FILM COATED ORAL at 09:04

## 2024-07-07 RX ADMIN — INSULIN LISPRO 4 UNITS: 100 INJECTION, SOLUTION INTRAVENOUS; SUBCUTANEOUS at 21:07

## 2024-07-07 RX ADMIN — ENOXAPARIN SODIUM 40 MG: 100 INJECTION SUBCUTANEOUS at 09:05

## 2024-07-07 RX ADMIN — POLYETHYLENE GLYCOL 3350 17 G: 17 POWDER, FOR SOLUTION ORAL at 21:08

## 2024-07-07 RX ADMIN — SODIUM CHLORIDE, PRESERVATIVE FREE 10 ML: 5 INJECTION INTRAVENOUS at 21:08

## 2024-07-07 RX ADMIN — ATORVASTATIN CALCIUM 20 MG: 20 TABLET, FILM COATED ORAL at 09:05

## 2024-07-07 RX ADMIN — INSULIN GLARGINE 32 UNITS: 100 INJECTION, SOLUTION SUBCUTANEOUS at 09:05

## 2024-07-07 RX ADMIN — INSULIN LISPRO 6 UNITS: 100 INJECTION, SOLUTION INTRAVENOUS; SUBCUTANEOUS at 18:04

## 2024-07-07 RX ADMIN — NIFEDIPINE 30 MG: 30 TABLET, EXTENDED RELEASE ORAL at 09:05

## 2024-07-07 ASSESSMENT — PAIN SCALES - GENERAL
PAINLEVEL_OUTOF10: 0

## 2024-07-07 NOTE — PLAN OF CARE
Problem: Safety - Adult  Goal: Free from fall injury  7/7/2024 1110 by Samantha Sterling, RN  Outcome: Progressing  7/6/2024 3085 by Jasmin Holman, RN  Outcome: Progressing

## 2024-07-07 NOTE — PLAN OF CARE
Problem: Discharge Planning  Goal: Discharge to home or other facility with appropriate resources  Outcome: Progressing     Problem: Pain  Goal: Verbalizes/displays adequate comfort level or baseline comfort level  Outcome: Progressing     Problem: Safety - Adult  Goal: Free from fall injury  7/6/2024 7945 by Jasmin Holman, RN  Outcome: Progressing  7/6/2024 1137 by Samantha Sterling RN  Outcome: Progressing     Problem: Skin/Tissue Integrity  Goal: Absence of new skin breakdown  Description: 1.  Monitor for areas of redness and/or skin breakdown  2.  Assess vascular access sites hourly  3.  Every 4-6 hours minimum:  Change oxygen saturation probe site  4.  Every 4-6 hours:  If on nasal continuous positive airway pressure, respiratory therapy assess nares and determine need for appliance change or resting period.  Outcome: Progressing     Problem: Chronic Conditions and Co-morbidities  Goal: Patient's chronic conditions and co-morbidity symptoms are monitored and maintained or improved  Outcome: Progressing

## 2024-07-07 NOTE — PROGRESS NOTES
Bedside and Verbal shift change report given to MAYDA Singh (oncoming nurse) by MAYDA Castellanos (offgoing nurse). Report included the following information Nurse Handoff Report, Adult Overview, Intake/Output, MAR, and Recent Results.

## 2024-07-07 NOTE — DISCHARGE SUMMARY
Discharge Summary    Name: Ann Turner  [id number]  YOB: 1950 (Age: 73 y.o.)   Date of Admission: 7/1/2024  Date of Discharge: 7/7/2024  Attending Physician: Mendel, David L, MD    Discharge Diagnosis:     Gait instability  Left greater than right muscle weakness-history CVA  NPH  Mild cervical cord compression  Essential hypertension  Hyperlipidemia  E coli UTI   Diabetes mellitus  CKD 3, at baseline   Baseline urinary incontinence   Constipation     Consultations:  IP CONSULT TO NEUROLOGY  IP CONSULT TO CASE MANAGEMENT      Brief Admission History/Reason for Admission Per Jonathan Taylor, DO:     Ann Turner is a 73 y.o.  female with PMHx as listed below presenting to the emergency department accompanied by son with concerns for progressively worsening bilateral lower extremity weakness and gait instability with at least 4 weakness related falls in the past month.  Patient reports most recent fall occurred 3 days prior falling onto her buttock with consequent \"tailbone pain\" since that time has been very reluctant to move around and is no longer able to ambulate safely or rise from seated position.  At baseline ambulates with walker.  Has history of CVA with residual left-sided weakness which is cause for need for walker.  Reports that her legs gave out from under her and she experience bilateral weakness stating that the left leg was more weak than the right.  Denies other associated symptoms.  Did not strike her head or lose consciousness.  Does experience intermittent episodes of lightheadedness with positional change.  Reports good p.o. intake.  Denies increasing confusion or altered mental status-confirmed with son at bedside.  Has chronic urinary incontinence which is unchanged.  ROS otherwise negative.  Denies tobacco, alcohol, illicit drugs.  No recent medication changes.     In the ED, patient afebrile and hemodynamically stable (hypertensive

## 2024-07-08 LAB
ANION GAP SERPL CALC-SCNC: 6 MMOL/L (ref 5–15)
BASOPHILS # BLD: 0.1 K/UL (ref 0–0.1)
BASOPHILS NFR BLD: 1 % (ref 0–1)
BUN SERPL-MCNC: 31 MG/DL (ref 6–20)
BUN/CREAT SERPL: 28 (ref 12–20)
CALCIUM SERPL-MCNC: 9.6 MG/DL (ref 8.5–10.1)
CHLORIDE SERPL-SCNC: 102 MMOL/L (ref 97–108)
CO2 SERPL-SCNC: 27 MMOL/L (ref 21–32)
CREAT SERPL-MCNC: 1.09 MG/DL (ref 0.55–1.02)
DIFFERENTIAL METHOD BLD: ABNORMAL
EOSINOPHIL # BLD: 0.3 K/UL (ref 0–0.4)
EOSINOPHIL NFR BLD: 3 % (ref 0–7)
ERYTHROCYTE [DISTWIDTH] IN BLOOD BY AUTOMATED COUNT: 13.2 % (ref 11.5–14.5)
GLUCOSE BLD STRIP.AUTO-MCNC: 204 MG/DL (ref 65–117)
GLUCOSE BLD STRIP.AUTO-MCNC: 216 MG/DL (ref 65–117)
GLUCOSE BLD STRIP.AUTO-MCNC: 236 MG/DL (ref 65–117)
GLUCOSE BLD STRIP.AUTO-MCNC: 255 MG/DL (ref 65–117)
GLUCOSE SERPL-MCNC: 311 MG/DL (ref 65–100)
HCT VFR BLD AUTO: 36.1 % (ref 35–47)
HGB BLD-MCNC: 11.9 G/DL (ref 11.5–16)
IMM GRANULOCYTES # BLD AUTO: 0.1 K/UL (ref 0–0.04)
IMM GRANULOCYTES NFR BLD AUTO: 1 % (ref 0–0.5)
LYMPHOCYTES # BLD: 2.1 K/UL (ref 0.8–3.5)
LYMPHOCYTES NFR BLD: 25 % (ref 12–49)
MAGNESIUM SERPL-MCNC: 2.2 MG/DL (ref 1.6–2.4)
MCH RBC QN AUTO: 27.8 PG (ref 26–34)
MCHC RBC AUTO-ENTMCNC: 33 G/DL (ref 30–36.5)
MCV RBC AUTO: 84.3 FL (ref 80–99)
MONOCYTES # BLD: 0.8 K/UL (ref 0–1)
MONOCYTES NFR BLD: 9 % (ref 5–13)
NEUTS SEG # BLD: 5.4 K/UL (ref 1.8–8)
NEUTS SEG NFR BLD: 61 % (ref 32–75)
NRBC # BLD: 0 K/UL (ref 0–0.01)
NRBC BLD-RTO: 0 PER 100 WBC
PHOSPHATE SERPL-MCNC: 3.7 MG/DL (ref 2.6–4.7)
PLATELET # BLD AUTO: 203 K/UL (ref 150–400)
PMV BLD AUTO: 11.1 FL (ref 8.9–12.9)
POTASSIUM SERPL-SCNC: 3.9 MMOL/L (ref 3.5–5.1)
RBC # BLD AUTO: 4.28 M/UL (ref 3.8–5.2)
SERVICE CMNT-IMP: ABNORMAL
SODIUM SERPL-SCNC: 135 MMOL/L (ref 136–145)
WBC # BLD AUTO: 8.6 K/UL (ref 3.6–11)

## 2024-07-08 PROCEDURE — 84100 ASSAY OF PHOSPHORUS: CPT

## 2024-07-08 PROCEDURE — 1100000003 HC PRIVATE W/ TELEMETRY

## 2024-07-08 PROCEDURE — 6370000000 HC RX 637 (ALT 250 FOR IP): Performed by: STUDENT IN AN ORGANIZED HEALTH CARE EDUCATION/TRAINING PROGRAM

## 2024-07-08 PROCEDURE — 97116 GAIT TRAINING THERAPY: CPT

## 2024-07-08 PROCEDURE — 85025 COMPLETE CBC W/AUTO DIFF WBC: CPT

## 2024-07-08 PROCEDURE — 6370000000 HC RX 637 (ALT 250 FOR IP): Performed by: NURSE PRACTITIONER

## 2024-07-08 PROCEDURE — 83735 ASSAY OF MAGNESIUM: CPT

## 2024-07-08 PROCEDURE — 80048 BASIC METABOLIC PNL TOTAL CA: CPT

## 2024-07-08 PROCEDURE — 97535 SELF CARE MNGMENT TRAINING: CPT

## 2024-07-08 PROCEDURE — 82962 GLUCOSE BLOOD TEST: CPT

## 2024-07-08 PROCEDURE — 2580000003 HC RX 258: Performed by: STUDENT IN AN ORGANIZED HEALTH CARE EDUCATION/TRAINING PROGRAM

## 2024-07-08 PROCEDURE — 36415 COLL VENOUS BLD VENIPUNCTURE: CPT

## 2024-07-08 PROCEDURE — 6360000002 HC RX W HCPCS: Performed by: STUDENT IN AN ORGANIZED HEALTH CARE EDUCATION/TRAINING PROGRAM

## 2024-07-08 RX ORDER — INSULIN GLARGINE 100 [IU]/ML
3 INJECTION, SOLUTION SUBCUTANEOUS ONCE
Status: COMPLETED | OUTPATIENT
Start: 2024-07-08 | End: 2024-07-08

## 2024-07-08 RX ORDER — INSULIN GLARGINE 100 [IU]/ML
35 INJECTION, SOLUTION SUBCUTANEOUS 2 TIMES DAILY
Status: DISCONTINUED | OUTPATIENT
Start: 2024-07-08 | End: 2024-07-09 | Stop reason: HOSPADM

## 2024-07-08 RX ADMIN — NIFEDIPINE 30 MG: 30 TABLET, EXTENDED RELEASE ORAL at 09:41

## 2024-07-08 RX ADMIN — INSULIN GLARGINE 32 UNITS: 100 INJECTION, SOLUTION SUBCUTANEOUS at 09:42

## 2024-07-08 RX ADMIN — INSULIN GLARGINE 3 UNITS: 100 INJECTION, SOLUTION SUBCUTANEOUS at 12:34

## 2024-07-08 RX ADMIN — INSULIN LISPRO 2 UNITS: 100 INJECTION, SOLUTION INTRAVENOUS; SUBCUTANEOUS at 18:18

## 2024-07-08 RX ADMIN — INSULIN LISPRO 4 UNITS: 100 INJECTION, SOLUTION INTRAVENOUS; SUBCUTANEOUS at 13:12

## 2024-07-08 RX ADMIN — ATORVASTATIN CALCIUM 20 MG: 20 TABLET, FILM COATED ORAL at 09:41

## 2024-07-08 RX ADMIN — ASPIRIN 81 MG: 81 TABLET, COATED ORAL at 09:42

## 2024-07-08 RX ADMIN — INSULIN GLARGINE 35 UNITS: 100 INJECTION, SOLUTION SUBCUTANEOUS at 21:36

## 2024-07-08 RX ADMIN — LOSARTAN POTASSIUM 100 MG: 50 TABLET, FILM COATED ORAL at 09:42

## 2024-07-08 RX ADMIN — SODIUM CHLORIDE, PRESERVATIVE FREE 10 ML: 5 INJECTION INTRAVENOUS at 09:44

## 2024-07-08 RX ADMIN — CARVEDILOL 25 MG: 12.5 TABLET, FILM COATED ORAL at 09:41

## 2024-07-08 RX ADMIN — SODIUM CHLORIDE, PRESERVATIVE FREE 10 ML: 5 INJECTION INTRAVENOUS at 21:39

## 2024-07-08 RX ADMIN — POLYETHYLENE GLYCOL 3350 17 G: 17 POWDER, FOR SOLUTION ORAL at 09:42

## 2024-07-08 RX ADMIN — INSULIN LISPRO 2 UNITS: 100 INJECTION, SOLUTION INTRAVENOUS; SUBCUTANEOUS at 09:43

## 2024-07-08 RX ADMIN — HYDROCHLOROTHIAZIDE 25 MG: 25 TABLET ORAL at 09:42

## 2024-07-08 RX ADMIN — SENNOSIDES 17.2 MG: 8.6 TABLET, FILM COATED ORAL at 09:42

## 2024-07-08 RX ADMIN — ENOXAPARIN SODIUM 40 MG: 100 INJECTION SUBCUTANEOUS at 09:43

## 2024-07-08 ASSESSMENT — PAIN SCALES - GENERAL
PAINLEVEL_OUTOF10: 0
PAINLEVEL_OUTOF10: 0

## 2024-07-08 NOTE — CARE COORDINATION
Transition of Care Plan:     RUR: 12%  Prior Level of Functioning: independent   Disposition: SNF placement- choice pending   If SNF or IPR: Date FOC offered: 7/2  Date FOC received: 7/2 7/5  Accepting facility: [facility]  Date authorization started with reference number: 7/5, reference#[id number]  Date authorization received and expires: pending review  Follow up appointments: after rehab   DME needed: available at facility   Transportation at discharge:  H2H (family to pay)  IM/IMM Medicare/ letter given: given   Is patient a [place] and connected with [facility]? no              If yes, was  transfer form completed and [facility] notified?   Caregiver Contact: son  Discharge Caregiver contacted prior to discharge? yes  Care Conference needed? no  Barriers to discharge:  insurance auth     Insurance authorization remains pending at this time. CM faxed updated notes today for review. Family has been updated. 2nd IM given. Will continue to follow.    SAVANNA Mckeon   Care Manager, [facility]  [phone]

## 2024-07-08 NOTE — DISCHARGE SUMMARY
Discharge Summary    Name: Ann Turner  [id number]  YOB: 1950 (Age: 73 y.o.)   Date of Admission: 7/1/2024  Date of Discharge: 7/8/2024  Attending Physician: Mendel, David L, MD    Discharge Diagnosis:     Gait instability  Left greater than right muscle weakness-history CVA  NPH  Mild cervical cord compression  Essential hypertension  Hyperlipidemia  E coli UTI   Diabetes mellitus  CKD 3, at baseline   Baseline urinary incontinence   Constipation     Consultations:  IP CONSULT TO NEUROLOGY  IP CONSULT TO CASE MANAGEMENT      Brief Admission History/Reason for Admission Per Jonathan Taylor, DO:     Ann Turner is a 73 y.o.  female with PMHx as listed below presenting to the emergency department accompanied by son with concerns for progressively worsening bilateral lower extremity weakness and gait instability with at least 4 weakness related falls in the past month.  Patient reports most recent fall occurred 3 days prior falling onto her buttock with consequent \"tailbone pain\" since that time has been very reluctant to move around and is no longer able to ambulate safely or rise from seated position.  At baseline ambulates with walker.  Has history of CVA with residual left-sided weakness which is cause for need for walker.  Reports that her legs gave out from under her and she experience bilateral weakness stating that the left leg was more weak than the right.  Denies other associated symptoms.  Did not strike her head or lose consciousness.  Does experience intermittent episodes of lightheadedness with positional change.  Reports good p.o. intake.  Denies increasing confusion or altered mental status-confirmed with son at bedside.  Has chronic urinary incontinence which is unchanged.  ROS otherwise negative.  Denies tobacco, alcohol, illicit drugs.  No recent medication changes.     In the ED, patient afebrile and hemodynamically stable (hypertensive  Remains medically stable for discharge, waiting on placement    Discharge Exam:  Patient seen and examined by me on discharge day.  Pertinent Findings:  Patient Vitals for the past 24 hrs:   BP Temp Temp src Pulse Resp SpO2 Weight   07/08/24 0732 (!) 139/57 97.3 °F (36.3 °C) Oral 76 18 92 % --   07/08/24 0600 -- -- -- -- -- -- 64 kg (141 lb)   07/07/24 1958 (!) 124/55 98.7 °F (37.1 °C) Oral 73 17 92 % --         General:          Alert, cooperative  EENT:              EOMI. Anicteric sclerae.  Resp:               CTA bilaterally, no wheezing or rales.  No accessory muscle use  CV:                  Regular  rate,  No edema  GI:                   Soft, Non distended, Non tender.  +Bowel sounds  Neurologic:       Alert and oriented X 3, normal speech,   Skin:                No rashes.  No jaundice    Discharge/Recent Laboratory Results:  Recent Labs     07/08/24  0416   *   K 3.9      CO2 27   BUN 31*   CREATININE 1.09*   GLUCOSE 311*   CALCIUM 9.6   PHOS 3.7   MG 2.2       Recent Labs     07/08/24  0416   HGB 11.9   HCT 36.1   WBC 8.6            Discharge Medications:     Medication List        START taking these medications      nicotine 14 MG/24HR  Commonly known as: NICODERM CQ  Place 1 patch onto the skin daily     NIFEdipine 30 MG extended release tablet  Commonly known as: PROCARDIA XL  Take 1 tablet by mouth daily     polyethylene glycol 17 g packet  Commonly known as: GLYCOLAX  Take 1 packet by mouth 2 times daily as needed for Constipation     senna 8.6 MG tablet  Commonly known as: SENOKOT  Take 2 tablets by mouth 2 times daily as needed for Constipation            CONTINUE taking these medications      aspirin 81 MG EC tablet     atorvastatin 10 MG tablet  Commonly known as: LIPITOR     carvedilol 25 MG tablet  Commonly known as: COREG     insulin glargine 100 UNIT/ML injection vial  Commonly known as: LANTUS     losartan-hydroCHLOROthiazide 100-25 MG per tablet  Commonly known as:

## 2024-07-08 NOTE — PLAN OF CARE
though continues to need significant verbal and visual cueing for safe RW use especially with transfers and directional changes. Small OB to L when turning to L with tendency to step outside of RW to L needing manual assist with RW management to maintain balance and safety. Mildly dyspneic with activity today but vitals checked and stable throughout on RA. SNF level rehab remains appropriate at discharge.       PLAN :  Patient continues to benefit from skilled intervention to address the above impairments.  Continue treatment per established plan of care to address goals.    Recommend with staff: OOB to recliner as tolerated. OOB to BSC/bathroom with assist x1 and RW for safety    Recommendation for discharge: (in order for the patient to meet his/her long term goals): Therapy up to 5 days/week in Skilled nursing facility    Other factors to consider for discharge: high risk for falls, not safe to be alone, and concern for safely navigating or managing the home environment    IF patient discharges home will need the following DME: continuing to assess with progress       SUBJECTIVE:   Patient stated “I don't think I'll ever get back to normal.”    OBJECTIVE DATA SUMMARY:   Cognitive/Behavioral Status:  Orientation  Overall Orientation Status: Within Normal Limits  Orientation Level: Oriented X4  Cognition  Cognition Comment: high hear of falling; anxious wih mobility    Functional Mobility and Transfers for ADLs:    Transfers:   Transfer Training  Transfer Training: Yes  Sit to Stand: Contact-guard assistance;Additional time  Stand to Sit: Contact-guard assistance  Bed to Chair: Minimum assistance           Balance:     Balance  Standing: Impaired  Standing - Static: Fair;Good;Constant support  Standing - Dynamic: Fair;Constant support;Good      Pt educated on safe transfer techniques, with specific emphasis on proper hand placement to push up from seated surface rather than attempt to pull self up, fully  positioning self in-front of desired seated location, feeling chair on back of legs and reaching back with 1-2 UE to slowly lower self to seated position.   Patient instructed and indicated understanding the benefits of maintaining activity tolerance, functional mobility, and independence with self care tasks during acute stay  to ensure safe return home and to baseline. Encouraged patient to increase frequency and duration OOB, be out of bed for all meals, perform daily ADLs (as approved by RN/MD regarding bathing etc), and performing functional mobility to/from bathroom.              Pain Rating:  Pt without c/o pain   Pain Intervention(s):         Activity Tolerance:   Fair   Please refer to the flowsheet for vital signs taken during this treatment.    After treatment:   Patient left in no apparent distress sitting up in chair, Call bell within reach, Bed/ chair alarm activated, and Caregiver / family present    COMMUNICATION/EDUCATION:   The patient's plan of care was discussed with: physical therapist and registered nurse    Patient Education  Education Given To: Patient  Education Provided: Plan of Care;Role of Therapy;ADL Adaptive Strategies;Mobility Training;Fall Prevention Strategies  Education Method: Verbal  Barriers to Learning: None  Education Outcome: Verbalized understanding;Continued education needed;Demonstrated understanding    Thank you for this referral.  Jessica Martinez OT  Minutes: 16

## 2024-07-08 NOTE — PLAN OF CARE
Problem: Discharge Planning  Goal: Discharge to home or other facility with appropriate resources  Outcome: Progressing     Problem: Pain  Goal: Verbalizes/displays adequate comfort level or baseline comfort level  Outcome: Progressing     Problem: Safety - Adult  Goal: Free from fall injury  7/8/2024 0031 by Jasmin Holman, RN  Outcome: Progressing  7/7/2024 1110 by Samantha Sterling, RN  Outcome: Progressing     Problem: Skin/Tissue Integrity  Goal: Absence of new skin breakdown  Description: 1.  Monitor for areas of redness and/or skin breakdown  2.  Assess vascular access sites hourly  3.  Every 4-6 hours minimum:  Change oxygen saturation probe site  4.  Every 4-6 hours:  If on nasal continuous positive airway pressure, respiratory therapy assess nares and determine need for appliance change or resting period.  Outcome: Progressing     Problem: Chronic Conditions and Co-morbidities  Goal: Patient's chronic conditions and co-morbidity symptoms are monitored and maintained or improved  Outcome: Progressing

## 2024-07-08 NOTE — PROGRESS NOTES
Bedside and Verbal shift change report given to MAYDA Singh (oncoming nurse) by MAYDA Castellanos (offgoing nurse). Report included the following information Nurse Handoff Report, Adult Overview, Intake/Output, MAR, and Recent Results.   Patient is awake during  shift change report no questions were asked.

## 2024-07-08 NOTE — PLAN OF CARE
Problem: Physical Therapy - Adult  Goal: By Discharge: Performs mobility at highest level of function for planned discharge setting.  See evaluation for individualized goals.  Description: FUNCTIONAL STATUS PRIOR TO ADMISSION: Patient was independent and active without use of DME.  Reports decline in her mobility for the past few weeks with 4-5 falls at home.    HOME SUPPORT PRIOR TO ADMISSION: The patient lived alone and has a son that lives in the area who can provide some intermittent assistance.    Physical Therapy Goals  Initiated 7/2/2024  1.  Patient will move from supine to sit and sit to supine in bed with supervision/set-up within 7 day(s).    2.  Patient will perform sit to stand with contact guard assist within 7 day(s).  3.  Patient will transfer from bed to chair and chair to bed with contact guard assist using the least restrictive device within 7 day(s).  4.  Patient will ambulate with contact guard assist for 150 feet with the least restrictive device within 7 day(s).     Outcome: Progressing   PHYSICAL THERAPY TREATMENT    Patient: Ann Turner (73 y.o. female)  Date: 7/8/2024  Diagnosis: Unsteady gait [R26.81]  Gait instability [R26.81]  Bilateral leg weakness [R29.898]  Acute cystitis without hematuria [N30.00]  Recurrent falls [R29.6] Unsteady gait      Precautions: Fall Risk                      ASSESSMENT:  Patient continues to benefit from skilled PT services and is slowly progressing towards goals. She demonstrates the ability to ambulate across the room with use of RW. Patient demonstrates improved standing balance. She continues to demonstrate L LE weakness ambulating with step to gait pattern v. Step through. Patient transition sit to supine with supervision.          PLAN:  Patient continues to benefit from skilled intervention to address the above impairments.  Continue treatment per established plan of care.        Recommendation for discharge: (in order for the patient to meet

## 2024-07-09 ENCOUNTER — OFFICE VISIT (OUTPATIENT)
Facility: CLINIC | Age: 74
End: 2024-07-09

## 2024-07-09 VITALS
OXYGEN SATURATION: 95 % | WEIGHT: 141 LBS | HEART RATE: 73 BPM | TEMPERATURE: 98.2 F | RESPIRATION RATE: 18 BRPM | BODY MASS INDEX: 28.43 KG/M2 | DIASTOLIC BLOOD PRESSURE: 51 MMHG | SYSTOLIC BLOOD PRESSURE: 147 MMHG | HEIGHT: 59 IN

## 2024-07-09 DIAGNOSIS — E78.2 MODERATE MIXED HYPERLIPIDEMIA NOT REQUIRING STATIN THERAPY: Chronic | ICD-10-CM

## 2024-07-09 DIAGNOSIS — R29.6 FREQUENT FALLS: Chronic | ICD-10-CM

## 2024-07-09 DIAGNOSIS — R53.1 GENERALIZED WEAKNESS: ICD-10-CM

## 2024-07-09 DIAGNOSIS — E11.9 DIABETES MELLITUS TYPE 2, INSULIN DEPENDENT (HCC): Chronic | ICD-10-CM

## 2024-07-09 DIAGNOSIS — I10 BENIGN ESSENTIAL HYPERTENSION: Chronic | ICD-10-CM

## 2024-07-09 DIAGNOSIS — Z79.4 DIABETES MELLITUS TYPE 2, INSULIN DEPENDENT (HCC): Chronic | ICD-10-CM

## 2024-07-09 DIAGNOSIS — Z86.73 HISTORY OF CEREBROVASCULAR ACCIDENT: Primary | Chronic | ICD-10-CM

## 2024-07-09 DIAGNOSIS — R53.1 LEFT-SIDED WEAKNESS: Chronic | ICD-10-CM

## 2024-07-09 DIAGNOSIS — R26.81 UNSTEADY GAIT: Chronic | ICD-10-CM

## 2024-07-09 DIAGNOSIS — G91.2 NPH (NORMAL PRESSURE HYDROCEPHALUS) (HCC): Chronic | ICD-10-CM

## 2024-07-09 LAB
ANION GAP SERPL CALC-SCNC: 7 MMOL/L (ref 5–15)
BASOPHILS # BLD: 0.1 K/UL (ref 0–0.1)
BASOPHILS NFR BLD: 1 % (ref 0–1)
BUN SERPL-MCNC: 32 MG/DL (ref 6–20)
BUN/CREAT SERPL: 27 (ref 12–20)
CALCIUM SERPL-MCNC: 9.5 MG/DL (ref 8.5–10.1)
CHLORIDE SERPL-SCNC: 102 MMOL/L (ref 97–108)
CO2 SERPL-SCNC: 27 MMOL/L (ref 21–32)
CREAT SERPL-MCNC: 1.18 MG/DL (ref 0.55–1.02)
DIFFERENTIAL METHOD BLD: ABNORMAL
EOSINOPHIL # BLD: 0.3 K/UL (ref 0–0.4)
EOSINOPHIL NFR BLD: 4 % (ref 0–7)
ERYTHROCYTE [DISTWIDTH] IN BLOOD BY AUTOMATED COUNT: 13.2 % (ref 11.5–14.5)
GLUCOSE BLD STRIP.AUTO-MCNC: 192 MG/DL (ref 65–117)
GLUCOSE SERPL-MCNC: 199 MG/DL (ref 65–100)
HCT VFR BLD AUTO: 36.9 % (ref 35–47)
HGB BLD-MCNC: 12.3 G/DL (ref 11.5–16)
IMM GRANULOCYTES # BLD AUTO: 0 K/UL (ref 0–0.04)
IMM GRANULOCYTES NFR BLD AUTO: 1 % (ref 0–0.5)
LYMPHOCYTES # BLD: 2.2 K/UL (ref 0.8–3.5)
LYMPHOCYTES NFR BLD: 26 % (ref 12–49)
MAGNESIUM SERPL-MCNC: 2.2 MG/DL (ref 1.6–2.4)
MCH RBC QN AUTO: 28.2 PG (ref 26–34)
MCHC RBC AUTO-ENTMCNC: 33.3 G/DL (ref 30–36.5)
MCV RBC AUTO: 84.6 FL (ref 80–99)
MONOCYTES # BLD: 0.7 K/UL (ref 0–1)
MONOCYTES NFR BLD: 8 % (ref 5–13)
NEUTS SEG # BLD: 5.1 K/UL (ref 1.8–8)
NEUTS SEG NFR BLD: 60 % (ref 32–75)
NRBC # BLD: 0 K/UL (ref 0–0.01)
NRBC BLD-RTO: 0 PER 100 WBC
PHOSPHATE SERPL-MCNC: 3.5 MG/DL (ref 2.6–4.7)
PLATELET # BLD AUTO: 206 K/UL (ref 150–400)
PMV BLD AUTO: 10.9 FL (ref 8.9–12.9)
POTASSIUM SERPL-SCNC: 4.3 MMOL/L (ref 3.5–5.1)
RBC # BLD AUTO: 4.36 M/UL (ref 3.8–5.2)
SERVICE CMNT-IMP: ABNORMAL
SODIUM SERPL-SCNC: 136 MMOL/L (ref 136–145)
WBC # BLD AUTO: 8.3 K/UL (ref 3.6–11)

## 2024-07-09 PROCEDURE — 6370000000 HC RX 637 (ALT 250 FOR IP): Performed by: STUDENT IN AN ORGANIZED HEALTH CARE EDUCATION/TRAINING PROGRAM

## 2024-07-09 PROCEDURE — 80048 BASIC METABOLIC PNL TOTAL CA: CPT

## 2024-07-09 PROCEDURE — 83735 ASSAY OF MAGNESIUM: CPT

## 2024-07-09 PROCEDURE — 85025 COMPLETE CBC W/AUTO DIFF WBC: CPT

## 2024-07-09 PROCEDURE — 2580000003 HC RX 258: Performed by: STUDENT IN AN ORGANIZED HEALTH CARE EDUCATION/TRAINING PROGRAM

## 2024-07-09 PROCEDURE — 6360000002 HC RX W HCPCS: Performed by: STUDENT IN AN ORGANIZED HEALTH CARE EDUCATION/TRAINING PROGRAM

## 2024-07-09 PROCEDURE — 84100 ASSAY OF PHOSPHORUS: CPT

## 2024-07-09 PROCEDURE — 82962 GLUCOSE BLOOD TEST: CPT

## 2024-07-09 PROCEDURE — 36415 COLL VENOUS BLD VENIPUNCTURE: CPT

## 2024-07-09 PROCEDURE — 6370000000 HC RX 637 (ALT 250 FOR IP): Performed by: NURSE PRACTITIONER

## 2024-07-09 RX ORDER — NICOTINE 21 MG/24HR
1 PATCH, TRANSDERMAL 24 HOURS TRANSDERMAL DAILY PRN
Qty: 30 PATCH | Refills: 0 | Status: SHIPPED | OUTPATIENT
Start: 2024-07-09 | End: 2024-08-08

## 2024-07-09 RX ADMIN — NIFEDIPINE 30 MG: 30 TABLET, EXTENDED RELEASE ORAL at 09:57

## 2024-07-09 RX ADMIN — ENOXAPARIN SODIUM 40 MG: 100 INJECTION SUBCUTANEOUS at 09:53

## 2024-07-09 RX ADMIN — CARVEDILOL 25 MG: 12.5 TABLET, FILM COATED ORAL at 09:53

## 2024-07-09 RX ADMIN — INSULIN GLARGINE 35 UNITS: 100 INJECTION, SOLUTION SUBCUTANEOUS at 09:54

## 2024-07-09 RX ADMIN — LOSARTAN POTASSIUM 100 MG: 50 TABLET, FILM COATED ORAL at 09:53

## 2024-07-09 RX ADMIN — SODIUM CHLORIDE, PRESERVATIVE FREE 10 ML: 5 INJECTION INTRAVENOUS at 09:54

## 2024-07-09 RX ADMIN — HYDROCHLOROTHIAZIDE 25 MG: 25 TABLET ORAL at 09:53

## 2024-07-09 RX ADMIN — ATORVASTATIN CALCIUM 20 MG: 20 TABLET, FILM COATED ORAL at 09:53

## 2024-07-09 RX ADMIN — ASPIRIN 81 MG: 81 TABLET, COATED ORAL at 09:53

## 2024-07-09 NOTE — DISCHARGE SUMMARY
Discharge Summary    Name: Ann Turner  [id number]  YOB: 1950 (Age: 73 y.o.)   Date of Admission: 7/1/2024  Date of Discharge: 7/9/2024  Attending Physician: Mendel, David L, MD    Discharge Diagnosis:     Gait instability  Left greater than right muscle weakness-history CVA  NPH  Mild cervical cord compression  Essential hypertension  Hyperlipidemia  E coli UTI   Diabetes mellitus  CKD 3, at baseline   Baseline urinary incontinence   Constipation     Consultations:  IP CONSULT TO NEUROLOGY  IP CONSULT TO CASE MANAGEMENT      Brief Admission History/Reason for Admission Per Jonathan Taylor, DO:     Ann Turner is a 73 y.o.  female with PMHx as listed below presenting to the emergency department accompanied by son with concerns for progressively worsening bilateral lower extremity weakness and gait instability with at least 4 weakness related falls in the past month.  Patient reports most recent fall occurred 3 days prior falling onto her buttock with consequent \"tailbone pain\" since that time has been very reluctant to move around and is no longer able to ambulate safely or rise from seated position.  At baseline ambulates with walker.  Has history of CVA with residual left-sided weakness which is cause for need for walker.  Reports that her legs gave out from under her and she experience bilateral weakness stating that the left leg was more weak than the right.  Denies other associated symptoms.  Did not strike her head or lose consciousness.  Does experience intermittent episodes of lightheadedness with positional change.  Reports good p.o. intake.  Denies increasing confusion or altered mental status-confirmed with son at bedside.  Has chronic urinary incontinence which is unchanged.  ROS otherwise negative.  Denies tobacco, alcohol, illicit drugs.  No recent medication changes.     In the ED, patient afebrile and hemodynamically stable (hypertensive

## 2024-07-09 NOTE — PROGRESS NOTES
I have reviewed discharge instructions with the guardian. The guardian verbalized understanding. Discharge medications reviewed with guardian and appropriate educational materials and side effects teaching were provided. Follow-up appointments reviewed. Opportunity for questions and clarification was provided.  Venous access removed without difficulty.  Patient's belongings gathered and sent with patient. Patient is ready for discharge.     Arnel Friedman RN

## 2024-07-09 NOTE — PROGRESS NOTES
Spiritual Care Partner Volunteer visited patient at [facility] in [facility] 3 MED TELE on 7/9/2024   Documented by: Chaplain Ramon Champagne M.Div., [facility].   Paging Service: [phone]

## 2024-07-10 ENCOUNTER — TELEPHONE (OUTPATIENT)
Age: 74
End: 2024-07-10

## 2024-07-11 NOTE — PROGRESS NOTES
Physician Progress Note      PATIENT:               MIKE MARSHALL  CSN #:                  [id number]  :                       1950  ADMIT DATE:       2024 5:30 PM  DISCH DATE:        2024 11:40 AM  RESPONDING  PROVIDER #:        David L MendelDavidLMD MD          QUERY TEXT:    Pt admitted with Muscle Weakness. Pt noted to have hx of CVA and Mild cervical   cord compression. If possible, please document in progress notes and   discharge summary the etiology of the muscle weakness.    The medical record reflects the following:  Risk Factors: pmhx prior CVA, Mild cervical cord compression    Clinical Indicators: Left greater than right muscle weakness    Treatment: Admit to telemetry monitoring  -Neurology consulted, greatly appreciate their expertise  -Continue PTA aspirin, atorvastatin, Coreg, losartan hydrochlorothiazide  -PT/OT consulted    Please call if you have any questions or need assistance. I can also be   reached via [employer] or [employer] # [phone].  Thank you,  Arely Jerez RN/CDI  Options provided:  -- Muscle weakness due to prior CVA  -- Muscle weakness due to cervical cord compression  -- Muscle weakness due to, Please specify other cause if known  -- Other - I will add my own diagnosis  -- Disagree - Not applicable / Not valid  -- Disagree - Clinically unable to determine / Unknown  -- Refer to Clinical Documentation Reviewer    PROVIDER RESPONSE TEXT:    Muscle weakness due to deconditioning    Query created by: Arely Jerez on 2024 2:27 PM      Electronically signed by:  David L MendelDavidLMD MD 2024 2:30 PM

## 2024-07-14 VITALS — DIASTOLIC BLOOD PRESSURE: 70 MMHG | SYSTOLIC BLOOD PRESSURE: 130 MMHG | HEART RATE: 76 BPM | RESPIRATION RATE: 20 BRPM

## 2024-07-14 PROBLEM — R53.1 GENERALIZED WEAKNESS: Status: ACTIVE | Noted: 2024-07-14

## 2024-07-14 PROBLEM — Z86.73 HISTORY OF CEREBROVASCULAR ACCIDENT: Chronic | Status: ACTIVE | Noted: 2024-07-14

## 2024-07-14 PROBLEM — G91.2 NPH (NORMAL PRESSURE HYDROCEPHALUS) (HCC): Chronic | Status: ACTIVE | Noted: 2024-07-14

## 2024-07-14 PROBLEM — R26.81 UNSTEADY GAIT: Chronic | Status: ACTIVE | Noted: 2024-07-02

## 2024-07-14 PROBLEM — R53.1 LEFT-SIDED WEAKNESS: Chronic | Status: ACTIVE | Noted: 2024-07-14

## 2024-07-16 ENCOUNTER — OFFICE VISIT (OUTPATIENT)
Facility: CLINIC | Age: 74
End: 2024-07-16
Payer: MEDICARE

## 2024-07-16 DIAGNOSIS — E11.9 DIABETES MELLITUS TYPE 2, INSULIN DEPENDENT (HCC): Chronic | ICD-10-CM

## 2024-07-16 DIAGNOSIS — Z79.4 DIABETES MELLITUS TYPE 2, INSULIN DEPENDENT (HCC): Chronic | ICD-10-CM

## 2024-07-16 DIAGNOSIS — I10 BENIGN ESSENTIAL HYPERTENSION: Primary | Chronic | ICD-10-CM

## 2024-07-16 DIAGNOSIS — R53.1 GENERALIZED WEAKNESS: ICD-10-CM

## 2024-07-16 DIAGNOSIS — E78.2 MODERATE MIXED HYPERLIPIDEMIA NOT REQUIRING STATIN THERAPY: Chronic | ICD-10-CM

## 2024-07-16 PROCEDURE — 3052F HG A1C>EQUAL 8.0%<EQUAL 9.0%: CPT | Performed by: NURSE PRACTITIONER

## 2024-07-16 PROCEDURE — 1123F ACP DISCUSS/DSCN MKR DOCD: CPT | Performed by: NURSE PRACTITIONER

## 2024-07-16 PROCEDURE — 99309 SBSQ NF CARE MODERATE MDM 30: CPT | Performed by: NURSE PRACTITIONER

## 2024-07-16 NOTE — PROGRESS NOTES
Skin; Eyes; denies any      Vitals:   Blood pressure 132/74 temperature 97.6 heart rate 75 respiration 18 O2 sat 98% on room air      Exam:  Constitutional: No acute distress;   Eyes: Sclera clear, PERRLA;   Ears/nose/mouth/throat:mmm, OP clear, trachea midline;  Cardiovascular: RRR,nml S1 and S2, no rubs murmurs or gallops, no edema, no cyanosis;   Respiratory: Clear to auscultation, symmetric, no respiratory distress;  Gastrointestinal: Abdomen soft, NT, ND, no masses, normal bowel sounds;  Neurologic: Cranial nerves II through VII grossly intact, no speech or motor deficits A&O in time place and person  Skin: No rash, warm and dry;  Musculoskeletal: No erythema swelling or joint tenderness, extremities without cyanosis, neck supple, ROM intact spine and extremities;  Psychiatric: Not agitated.  Appropriate affect, mood, judgment and insight.  Genitourinary: No suprapubic tenderness or flank tenderness  Heme, lymph, immuno: No pallor;       Labs:        Assessment/Plans:   1. Benign essential hypertension  Assessment & Plan:  Stable on current dose of antihypertensive including: Carvedilol 25 mg twice a day, NIFEdipine 60 mg  and Losartan Potassium-HCTZ 100-25 mg.  2. Diabetes mellitus type 2, insulin dependent (HCC)  Assessment & Plan:  She continues on metformin.  Blood glucose continues to be in the high 200s, new order placed for sliding scale Humalog coverage before meals and at bedtime.  She also continues on Lantus 35 units at bedtime.  3. Moderate mixed hyperlipidemia not requiring statin therapy  Assessment & Plan:  Continues on atorvastatin 10 mg and aspirin 81 mg for heart health  4. Generalized weakness  Assessment & Plan:  Patient continues to work with OT and PT notes reviewed     Theresa Smith, APRN - CNP

## 2024-07-18 ENCOUNTER — OFFICE VISIT (OUTPATIENT)
Facility: CLINIC | Age: 74
End: 2024-07-18

## 2024-07-18 DIAGNOSIS — E11.9 DIABETES MELLITUS TYPE 2, INSULIN DEPENDENT (HCC): Chronic | ICD-10-CM

## 2024-07-18 DIAGNOSIS — Z79.4 DIABETES MELLITUS TYPE 2, INSULIN DEPENDENT (HCC): Chronic | ICD-10-CM

## 2024-07-18 DIAGNOSIS — E78.2 MODERATE MIXED HYPERLIPIDEMIA NOT REQUIRING STATIN THERAPY: Primary | Chronic | ICD-10-CM

## 2024-07-18 DIAGNOSIS — I10 BENIGN ESSENTIAL HYPERTENSION: Chronic | ICD-10-CM

## 2024-07-18 DIAGNOSIS — R53.1 GENERALIZED WEAKNESS: ICD-10-CM

## 2024-07-18 NOTE — ASSESSMENT & PLAN NOTE
She continues on metformin.  Blood glucose continues to be in the high 200s, new order placed for sliding scale Humalog coverage before meals and at bedtime.  She also continues on Lantus 35 units at bedtime.

## 2024-07-18 NOTE — ASSESSMENT & PLAN NOTE
Stable on current dose of antihypertensive including: Carvedilol 25 mg twice a day, NIFEdipine 60 mg  and Losartan Potassium-HCTZ 100-25 mg.

## 2024-07-18 NOTE — PROGRESS NOTES
PLACE OF SERVICE:  [facility] [address] [place] [address]       SKILLED VISIT        Chief Complaint: Generalized weakness unable to ambulate  Status post fall without injuries    HPI : Ann Turner is a 73 y.o. female patient seen today for follow-up.  Patient is alert and responsive able to make her needs known.  She denies any symptoms respiratory distress, lungs clear upon assessment.  Staff reported patient complaining of dysuria and noted foul-smelling urine, orders placed for urine analysis with reflex to culture to be collected. She remains stable on Tylenol 650 mg every 6 hours as needed.  For history of hypertension, she continues on Carvedilol 25 mg twice a day, NIFEdipine 60 mg  and Losartan Potassium-HCTZ 100-25 mg. Atorvastatin 10 mg for hyperlipidemia and aspirin 81 mg for heart health she denies signs or symptoms of constipation and remains stable on MiraLAX and senna 2 tabs every 12 hours. She continues on Prozac 10 mg for history of depression, in-house psych NP to evaluate and treat as needed. For history of diabetes she continues on metformin 1000 mg daily, blood sugar continues to run in the high 200s. Continues on sliding scale with Humalog coverage, she continues on Lantus 35 units at bedtime. Patient continues to work with OT and PT notes reviewed. Patient remains hemodynamically stable at this time, remains at risk for decompensation and rehospitalization. Will continue to monitor closely.       PMH:   Left greater than right muscle weakness-history CVA, Mild cervical cord compression  Essential hypertension, Hyperlipidemia, E coli UTI, Diabetes mellitus, CKD 3,   Constipation     Social History / Family History:      Medications: Reviewed in EMR and assessment and plan    ROS:   The following system review was negative:  Constitutional; Respiratory; Cardiovascular; Genitourinary; Gastrointestinal; Psychiatric; Ear-Nose-Throat; Musculoskeletal;

## 2024-07-23 ENCOUNTER — OFFICE VISIT (OUTPATIENT)
Facility: CLINIC | Age: 74
End: 2024-07-23
Payer: MEDICARE

## 2024-07-23 DIAGNOSIS — R11.0 NAUSEA: ICD-10-CM

## 2024-07-23 DIAGNOSIS — I10 BENIGN ESSENTIAL HYPERTENSION: Primary | Chronic | ICD-10-CM

## 2024-07-23 DIAGNOSIS — R53.1 GENERALIZED WEAKNESS: ICD-10-CM

## 2024-07-23 DIAGNOSIS — Z79.4 DIABETES MELLITUS TYPE 2, INSULIN DEPENDENT (HCC): Chronic | ICD-10-CM

## 2024-07-23 DIAGNOSIS — E78.2 MODERATE MIXED HYPERLIPIDEMIA NOT REQUIRING STATIN THERAPY: Chronic | ICD-10-CM

## 2024-07-23 DIAGNOSIS — E11.9 DIABETES MELLITUS TYPE 2, INSULIN DEPENDENT (HCC): Chronic | ICD-10-CM

## 2024-07-23 PROCEDURE — 1123F ACP DISCUSS/DSCN MKR DOCD: CPT | Performed by: NURSE PRACTITIONER

## 2024-07-23 PROCEDURE — 3052F HG A1C>EQUAL 8.0%<EQUAL 9.0%: CPT | Performed by: NURSE PRACTITIONER

## 2024-07-23 PROCEDURE — 99309 SBSQ NF CARE MODERATE MDM 30: CPT | Performed by: NURSE PRACTITIONER

## 2024-07-23 NOTE — PROGRESS NOTES
meals and at bedtime and Lantus 35 units at bedtime.  Current blood glucose monitoring 40 mg/dl.   5. Generalized weakness  Assessment & Plan:  Patient continues to work with OT and PT notes reviewed: Pt participated in stair training ascending/descending 4 steps with use of B HR and CG-Any. VC for safety and foot placement on stairs. Patient began experiencing increased symptoms of dizziness and nausea towards end of second standing trial.         Theresa Smith, VEE - CNP

## 2024-07-23 NOTE — ASSESSMENT & PLAN NOTE
She continues on sliding scale Humalog coverage before meals and at bedtime and Lantus 35 units at bedtime.  Current blood glucose monitoring 40 mg/dl.

## 2024-07-23 NOTE — ASSESSMENT & PLAN NOTE
Patient continues to work with OT and PT notes reviewed: Pt participated in stair training ascending/descending 4 steps with use of B HR and CG-Any. VC for safety and foot placement on stairs. Patient began experiencing increased symptoms of dizziness and nausea towards end of second standing trial.

## 2024-07-23 NOTE — ASSESSMENT & PLAN NOTE
Patient complains of nausea without vomiting.  Zofran 4 mg as needed given and tolerated well with effective results.

## 2024-07-25 ENCOUNTER — OFFICE VISIT (OUTPATIENT)
Facility: CLINIC | Age: 74
End: 2024-07-25

## 2024-07-25 DIAGNOSIS — E78.2 MODERATE MIXED HYPERLIPIDEMIA NOT REQUIRING STATIN THERAPY: Chronic | ICD-10-CM

## 2024-07-25 DIAGNOSIS — Z79.4 DIABETES MELLITUS TYPE 2, INSULIN DEPENDENT (HCC): Chronic | ICD-10-CM

## 2024-07-25 DIAGNOSIS — R53.1 GENERALIZED WEAKNESS: ICD-10-CM

## 2024-07-25 DIAGNOSIS — E11.9 DIABETES MELLITUS TYPE 2, INSULIN DEPENDENT (HCC): Chronic | ICD-10-CM

## 2024-07-25 DIAGNOSIS — I10 BENIGN ESSENTIAL HYPERTENSION: Primary | Chronic | ICD-10-CM

## 2024-07-26 NOTE — PROGRESS NOTES
ED, patient afebrile and hemodynamically stable (hypertensive 140s/60s), saturating mid 90s on room air.  ECG demonstrates normal sinus rhythm without definitive ischemic change.  CT head demonstrates lateral and third ventricle prominence slightly out of proportion to sulcal likely reflecting atrophy noting that NPH should be considered as well as nonspecific white matter changes likely reflective of microvascular ischemic change.  CXR negative for acute process.  Labs demonstrate: CBC unremarkable, sodium 137, potassium 3.9, glucose 122, BUN 35, creatinine 1.06, LFTs grossly unremarkable, high sensitive troponin 9, UA not reflexed to culture.  Patient given 1 g IV ceftriaxone by ED provider.\"     The patient requires Skilled Nursing Facility care for rehabilitation due to multiple medical issues as noted.    The history is obtained from the patient, family and previous records and is felt to be satisfactory to establish an acceptable plan of care. Health status indicators were reviewed and there are no changes except as noted above. The past medical history, family history, social history and ethnic considerations have been updated as needed. The patient denies any constitutional, ENT, cardiopulmonary, gastrointestinal, or genitourinary issues otherwise as review in the ROS.     Discharge time : Patient left facility with son at 12:40 pm    Brief SNF Course:  This is an 73 y.o. female patient alert and responsive able to make her needs known.  No signs or symptoms of respiratory distress, lungs clear upon assessment.  She denies complaints of pain or discomfort, remains  stable on Tylenol 650 mg every 6 hours as needed.  For history of hypertension, she continues on Carvedilol 25 mg twice a day, NIFEdipine 60 mg  and Losartan Potassium-HCTZ 100-25 mg.  Blood pressure within therapeutic range throughout facility stay. Continues on atorvastatin 10 mg for hyperlipidemia and aspirin 81 mg for heart health. Patient

## 2025-01-02 NOTE — ASSESSMENT & PLAN NOTE
Resume regular diet   Stable on current dose of antihypertensive including: Carvedilol 25 mg twice a day, NIFEdipine 60 mg and Losartan Potassium-HCTZ 100-25 mg

## (undated) DEVICE — SUT ETHLN 4-0 18IN PS2 BLK --

## (undated) DEVICE — SUTURE VCRL SZ 3-0 L27IN ABSRB UD L26MM SH 1/2 CIR J416H

## (undated) DEVICE — GOWN,SIRUS,NONRNF,SETINSLV,2XL,18/CS: Brand: MEDLINE

## (undated) DEVICE — BANDAGE,GAUZE,BULKEE II,4.5"X4.1YD,STRL: Brand: MEDLINE

## (undated) DEVICE — EXTREMITY-MRMC: Brand: MEDLINE INDUSTRIES, INC.

## (undated) DEVICE — DRAPE,REIN 53X77,STERILE: Brand: MEDLINE

## (undated) DEVICE — GLOVE SURG SZ 8 CRM LTX FREE POLYISOPRENE POLYMER BEAD ANTI

## (undated) DEVICE — SOLUTION IRRIG 1000ML 0.9% SOD CHL USP POUR PLAS BTL

## (undated) DEVICE — TRAY PREP DRY W/ PREM GLV 2 APPL 6 SPNG 2 UNDPD 1 OVERWRAP

## (undated) DEVICE — DRESSING PETRO W3XL8IN N ADH OIL EMUL GZ CURAD